# Patient Record
Sex: MALE | Race: WHITE | NOT HISPANIC OR LATINO | Employment: FULL TIME | URBAN - NONMETROPOLITAN AREA
[De-identification: names, ages, dates, MRNs, and addresses within clinical notes are randomized per-mention and may not be internally consistent; named-entity substitution may affect disease eponyms.]

---

## 2019-12-03 ENCOUNTER — OFFICE VISIT CONVERTED (OUTPATIENT)
Dept: FAMILY MEDICINE CLINIC | Age: 39
End: 2019-12-03
Attending: FAMILY MEDICINE

## 2019-12-03 ENCOUNTER — HOSPITAL ENCOUNTER (OUTPATIENT)
Dept: OTHER | Facility: HOSPITAL | Age: 39
Discharge: HOME | End: 2019-12-03
Attending: FAMILY MEDICINE

## 2019-12-03 LAB
ALBUMIN SERPL-MCNC: 4.5 G/DL (ref 3.5–5)
ALBUMIN/GLOB SERPL: 1.5 {RATIO} (ref 1.4–2.6)
ALP SERPL-CCNC: 61 U/L (ref 53–128)
ALT SERPL-CCNC: 46 U/L (ref 10–40)
ANION GAP SERPL CALC-SCNC: 20 MMOL/L (ref 8–19)
AST SERPL-CCNC: 20 U/L (ref 15–50)
BASOPHILS # BLD AUTO: 0.05 10*3/UL (ref 0–0.2)
BASOPHILS NFR BLD AUTO: 0.3 % (ref 0–3)
BILIRUB SERPL-MCNC: 0.73 MG/DL (ref 0.2–1.3)
BUN SERPL-MCNC: 12 MG/DL (ref 5–25)
BUN/CREAT SERPL: 10 {RATIO} (ref 6–20)
CALCIUM SERPL-MCNC: 9.5 MG/DL (ref 8.7–10.4)
CHLORIDE SERPL-SCNC: 101 MMOL/L (ref 99–111)
CONV ABS IMM GRAN: 0.07 10*3/UL (ref 0–0.2)
CONV CO2: 23 MMOL/L (ref 22–32)
CONV IMMATURE GRAN: 0.5 % (ref 0–1.8)
CONV TOTAL PROTEIN: 7.5 G/DL (ref 6.3–8.2)
CREAT UR-MCNC: 1.21 MG/DL (ref 0.7–1.2)
DEPRECATED RDW RBC AUTO: 38.5 FL (ref 35.1–43.9)
EOSINOPHIL # BLD AUTO: 0.02 10*3/UL (ref 0–0.7)
EOSINOPHIL # BLD AUTO: 0.1 % (ref 0–7)
ERYTHROCYTE [DISTWIDTH] IN BLOOD BY AUTOMATED COUNT: 11.9 % (ref 11.6–14.4)
GFR SERPLBLD BASED ON 1.73 SQ M-ARVRAT: >60 ML/MIN/{1.73_M2}
GLOBULIN UR ELPH-MCNC: 3 G/DL (ref 2–3.5)
GLUCOSE SERPL-MCNC: 103 MG/DL (ref 70–99)
HCT VFR BLD AUTO: 46.2 % (ref 42–52)
HGB BLD-MCNC: 15.9 G/DL (ref 14–18)
LYMPHOCYTES # BLD AUTO: 1.67 10*3/UL (ref 1–5)
LYMPHOCYTES NFR BLD AUTO: 11.3 % (ref 20–45)
MCH RBC QN AUTO: 30.6 PG (ref 27–31)
MCHC RBC AUTO-ENTMCNC: 34.4 G/DL (ref 33–37)
MCV RBC AUTO: 89 FL (ref 80–96)
MONOCYTES # BLD AUTO: 1.19 10*3/UL (ref 0.2–1.2)
MONOCYTES NFR BLD AUTO: 8.1 % (ref 3–10)
NEUTROPHILS # BLD AUTO: 11.75 10*3/UL (ref 2–8)
NEUTROPHILS NFR BLD AUTO: 79.7 % (ref 30–85)
NRBC CBCN: 0 % (ref 0–0.7)
OSMOLALITY SERPL CALC.SUM OF ELEC: 290 MOSM/KG (ref 273–304)
PLATELET # BLD AUTO: 185 10*3/UL (ref 130–400)
PMV BLD AUTO: 10.6 FL (ref 9.4–12.4)
POTASSIUM SERPL-SCNC: 3.6 MMOL/L (ref 3.5–5.3)
RBC # BLD AUTO: 5.19 10*6/UL (ref 4.7–6.1)
SODIUM SERPL-SCNC: 140 MMOL/L (ref 135–147)
TSH SERPL-ACNC: 2.37 M[IU]/L (ref 0.27–4.2)
WBC # BLD AUTO: 14.75 10*3/UL (ref 4.8–10.8)

## 2019-12-05 ENCOUNTER — OFFICE VISIT CONVERTED (OUTPATIENT)
Dept: FAMILY MEDICINE CLINIC | Age: 39
End: 2019-12-05
Attending: FAMILY MEDICINE

## 2019-12-05 LAB — BACTERIA SPEC AEROBE CULT: NORMAL

## 2020-01-07 ENCOUNTER — OFFICE VISIT CONVERTED (OUTPATIENT)
Dept: FAMILY MEDICINE CLINIC | Age: 40
End: 2020-01-07
Attending: FAMILY MEDICINE

## 2020-01-07 ENCOUNTER — HOSPITAL ENCOUNTER (OUTPATIENT)
Dept: OTHER | Facility: HOSPITAL | Age: 40
Discharge: HOME | End: 2020-01-07
Attending: FAMILY MEDICINE

## 2020-01-07 LAB
ALBUMIN SERPL-MCNC: 4.4 G/DL (ref 3.5–5)
ALBUMIN/GLOB SERPL: 1.5 {RATIO} (ref 1.4–2.6)
ALP SERPL-CCNC: 61 U/L (ref 53–128)
ALT SERPL-CCNC: 42 U/L (ref 10–40)
ANION GAP SERPL CALC-SCNC: 16 MMOL/L (ref 8–19)
AST SERPL-CCNC: 22 U/L (ref 15–50)
BASOPHILS # BLD MANUAL: 0.03 10*3/UL (ref 0–0.2)
BASOPHILS NFR BLD MANUAL: 0.4 % (ref 0–3)
BILIRUB SERPL-MCNC: 0.62 MG/DL (ref 0.2–1.3)
BUN SERPL-MCNC: 14 MG/DL (ref 5–25)
BUN/CREAT SERPL: 12 {RATIO} (ref 6–20)
CALCIUM SERPL-MCNC: 9 MG/DL (ref 8.7–10.4)
CHLORIDE SERPL-SCNC: 104 MMOL/L (ref 99–111)
CONV CO2: 25 MMOL/L (ref 22–32)
CONV TOTAL PROTEIN: 7.4 G/DL (ref 6.3–8.2)
CREAT UR-MCNC: 1.19 MG/DL (ref 0.7–1.2)
DEPRECATED RDW RBC AUTO: 40.4 FL
EOSINOPHIL # BLD MANUAL: 0.08 10*3/UL (ref 0–0.7)
EOSINOPHIL NFR BLD MANUAL: 1 % (ref 0–7)
ERYTHROCYTE [DISTWIDTH] IN BLOOD BY AUTOMATED COUNT: 12.3 % (ref 11.5–14.5)
GFR SERPLBLD BASED ON 1.73 SQ M-ARVRAT: >60 ML/MIN/{1.73_M2}
GLOBULIN UR ELPH-MCNC: 3 G/DL (ref 2–3.5)
GLUCOSE SERPL-MCNC: 129 MG/DL (ref 70–99)
GRANS (ABSOLUTE): 5.31 10*3/UL (ref 2–8)
GRANS: 68.4 % (ref 30–85)
HBA1C MFR BLD: 16.1 G/DL (ref 14–18)
HCT VFR BLD AUTO: 47.4 % (ref 42–52)
IMM GRANULOCYTES # BLD: 0.04 10*3/UL (ref 0–0.54)
IMM GRANULOCYTES NFR BLD: 0.5 % (ref 0–0.43)
LYMPHOCYTES # BLD MANUAL: 1.71 10*3/UL (ref 1–5)
LYMPHOCYTES NFR BLD MANUAL: 7.7 % (ref 3–10)
MCH RBC QN AUTO: 30.3 PG (ref 27–31)
MCHC RBC AUTO-ENTMCNC: 34 G/DL (ref 33–37)
MCV RBC AUTO: 89.3 FL (ref 80–96)
MONOCYTES # BLD AUTO: 0.6 10*3/UL (ref 0.2–1.2)
OSMOLALITY SERPL CALC.SUM OF ELEC: 294 MOSM/KG (ref 273–304)
PLATELET # BLD AUTO: 201 10*3/UL (ref 130–400)
PMV BLD AUTO: 10.1 FL (ref 7.4–10.4)
POTASSIUM SERPL-SCNC: 3.7 MMOL/L (ref 3.5–5.3)
RBC # BLD AUTO: 5.31 10*6/UL (ref 4.7–6.1)
SODIUM SERPL-SCNC: 141 MMOL/L (ref 135–147)
TSH SERPL-ACNC: 1.83 M[IU]/L (ref 0.27–4.2)
VARIANT LYMPHS NFR BLD MANUAL: 22 % (ref 20–45)
WBC # BLD AUTO: 7.77 10*3/UL (ref 4.8–10.8)

## 2020-01-10 LAB
EST. AVERAGE GLUCOSE BLD GHB EST-MCNC: 108 MG/DL
HBA1C MFR BLD: 5.4 % (ref 3.5–5.7)

## 2020-01-17 LAB
ALDOST SERPL-MCNC: 7.8 NG/DL (ref 0–30)
ALDOST/RENIN PLAS-RTO: 5 {RATIO} (ref 0–30)
RENIN PLAS-CCNC: 1.55 NG/ML/HR (ref 0.17–5.38)

## 2020-02-25 ENCOUNTER — OFFICE VISIT CONVERTED (OUTPATIENT)
Dept: FAMILY MEDICINE CLINIC | Age: 40
End: 2020-02-25
Attending: NURSE PRACTITIONER

## 2021-05-18 NOTE — PROGRESS NOTES
Robin Patel  1980     Office/Outpatient Visit    Visit Date: Tue, Dec 3, 2019 03:17 pm    Provider: Robin Guerrero MD (Assistant: Elvi Pruitt, )    Location: [facility]        Electronically signed by Robin Guerrero MD on  12/03/2019 07:41:32 PM                             Subjective:        CC: Mr. Patel is a 39 year old White male.  This is his first visit to the clinic.  presents today due to sore throat, congestion         HPI:       Sore throat since yesterday along with feeling ill. This morning it felt and he coughed up some brown mucous. No fever but feels sweaty, warm, no chills.       BP today is 143/97 with a HR of 118 and recheck is 144/96 with a HR of 119. BP has been elevated here previously as well. He denies feeling tachycardia or palpitations. No headache or blurry vision.    ROS:     CONSTITUTIONAL:  Positive for fatigue.   Negative for fever.      EYES:  Negative for blurred vision.      E/N/T:  Positive for nasal congestion, sinus pressure and sore throat.   Negative for diminished hearing.      CARDIOVASCULAR:  Negative for chest pain and palpitations.      RESPIRATORY:  Positive for recent cough ( with scant amounts of purulent sputum ).   Negative for dyspnea.      GASTROINTESTINAL:  Negative for abdominal pain, constipation, diarrhea, nausea and vomiting.      MUSCULOSKELETAL:  Negative for arthralgias and myalgias.      NEUROLOGICAL:  Negative for weakness.      PSYCHIATRIC:  Negative for anxiety, depression and sleep disturbance.          Past Medical History / Family History / Social History:         Last Reviewed on 12/03/2019 03:45 PM by Robin Guerrero    Past Medical History:             PAST MEDICAL HISTORY     UNREMARKABLE         Surgical History:         wisdom teeth removal;         Family History:     Father: Healthy     Mother: chronic bronchitis     Brother(s): Healthy; 1 brother(s) total     Sister(s): 1 sister(s) total;  Breast Cancer      Paternal Grandfather: Medical history unknown;      Paternal Grandmother: Medical history unknown;      Maternal Grandfather: Lung Cancer     Maternal Grandmother: ;  uterine CA         Social History:     Occupation: a equipment rental.   [employer]     Marital Status: Single     Children: None         Tobacco/Alcohol/Supplements:     Last Reviewed on 2019 03:45 PM by Robin Guerrero    Tobacco: He has a past history of cigarette smoking; quit date:  .  Non-drinker     Caffeine:  He admits to consuming caffeine via soda ( -Mt Dew ).          Substance Abuse History:     Last Reviewed on 2019 03:45 PM by Robin Guerrero    None         Mental Health History:     Last Reviewed on 2019 03:45 PM by Robin Guerrero    NEGATIVE         Communicable Diseases (eg STDs):     Last Reviewed on 2019 03:45 PM by Robin Guerrero        Current Problems:     Last Reviewed on 2019 03:45 PM by Robin Guerrero    Acute pharyngitis, unspecified        Immunizations:     Influenza, seasonal, injectable, preservative free 2019        Allergies:     Last Reviewed on 2019 03:45 PM by Robin Guerrero    No Known Allergies.        Current Medications:     Last Reviewed on 2019 03:45 PM by Robin Guerrero    OTC cold and sinus PRN     Xyzal 5 mg oral tablet [daily]        Objective:        Vitals:         Current: 12/3/2019 3:29:48 PM    Ht:  6 ft, 0.25 in;  Wt: 299 lbs;  BMI: 40.3T: 98.9 F (oral);  BP: 143/97 mm Hg (left arm, sitting);  P: 118 bpm (left arm (BP Cuff), sitting)        Repeat:     3:31:52 PM  BP:   144/96mm Hg (right arm, sitting, HR: 119)     Exams:     PHYSICAL EXAM:     GENERAL: Vitals recorded well developed, well nourished;  well groomed;  no apparent distress, tired-appearing;     EYES: extraocular movements intact; conjunctiva and cornea are normal; PERRLA;     E/N/T: OROPHARYNX: posterior pharynx shows erythema;     NECK: range of motion is  normal; thyroid exam reveals not enlarged;     RESPIRATORY: normal respiratory rate and pattern with no distress; normal breath sounds with no rales, rhonchi, wheezes or rubs;     CARDIOVASCULAR: normal rate; rhythm is regular;  no systolic murmur; no edema;     GASTROINTESTINAL: nontender; normal bowel sounds;     LYMPHATIC: no enlargement of cervical or facial nodes;     MUSCULOSKELETAL: normal gait; normal overall tone     NEUROLOGIC: mental status: alert and oriented x 3;     PSYCHIATRIC:  appropriate affect and demeanor; normal speech pattern; grossly normal memory;         Lab/Test Results:         Rapid Strep Screen: Negative (12/03/2019),     Performed by:: tls (12/03/2019),             Assessment:         J02.9   Acute pharyngitis, unspecified       I10   Essential (primary) hypertension           ORDERS:         Lab Orders:       [id number]  BDC [facility] CBC with 3 part diff  (Send-Out)            66020  COMP - [facility] Comp. Metabolic Panel  (Send-Out)            64684  TSH - [facility] TSH  (Send-Out)            APPTO  Appointment need  (In-House)            47266  Group A Streptococcus detection by immunoassay with direct optical observation  (In-House)            31555  [facility] Throat culture, strep  (Send-Out)              Other Orders:         Depression screen negative  (In-House)                      Plan:         Acute pharyngitis, unspecifiedRapid strep is negative, pt advised this is likely viral pharyngitis and will resolve with time and rest and supportive care.           Orders:       [id number]  Group A Streptococcus detection by immunoassay with direct optical observation  (In-House)            07862  [facility] Throat culture, strep  (Send-Out)              Essential (primary) hypertensionBP slightly elevated today and at previous visits. HR is more so elevated and basic labs are ordered to assess for anemia and hyperthyroidism. Pt will RTC in 2 weeks for BP and HR recheck. EKG not ordered today given  his acute illness and he has no palpitations, dizziness, or lightheadedness.    LABORATORY:  Labs ordered to be performed today include CBC, Comprehensive metabolic panel, and TSH.  [facility] PHQ-9 Depression Screening: Completed form scanned and in chart; Total Score 0; Negative Depression Screen     FOLLOW-UP: Schedule a follow-up visit in 1 month.:.            Orders:       [id number]  BDCBC - [facility] CBC with 3 part diff  (Send-Out)            46179  COMP - [facility] Comp. Metabolic Panel  (Send-Out)            61862  TSH - [facility] TSH  (Send-Out)            APPTO  Appointment need  (In-House)              Depression screen negative  (In-House)                  Patient Recommendations:        For  Essential (primary) hypertension:    Schedule a follow-up visit in 1 month.                APPOINTMENT INFORMATION:        Monday Tuesday Wednesday Thursday Friday Saturday Sunday            Time:___________________AM  PM   Date:_____________________             Charge Capture:         Primary Diagnosis:     J02.9  Acute pharyngitis, unspecified           Orders:      [id number]  Office visit - new pt, level 2  (In-House)            25944  Group A Streptococcus detection by immunoassay with direct optical observation  (In-House)              I10  Essential (primary) hypertension           Orders:      APPTO  Appointment need  (In-House)              Depression screen negative  (In-House)                  ADDENDUMS:      ____________________________________    Addendum: 12/31/2019 03:44 PM - Robin Guerrero        ADDENDUM: Add [id number]; Remove [id number]

## 2021-05-18 NOTE — PROGRESS NOTES
VintonRobin  1980     Office/Outpatient Visit    Visit Date: Tue, Jan 7, 2020 10:05 am    Provider: Robin Guerrero MD (Assistant: Darlene Cordon MA)    Location: [facility]        Electronically signed by Robin Guerrero MD on  01/07/2020 06:44:23 PM                             Subjective:        CC: Mr. Patel is a 39 year old White male.  This is a follow-up visit.  blood pressure         HPI:       BP today is 145/95 with a HR of 99. BP and HR are typically elevated on all visits here. I saw pt 1 month ago for sore throat and this resolved after he was given augmentin by Dr. Orourke when Sx worsen. He is not currently on any BP medications. No headache or blurry vision. He still has more nasal and sinus congestion than usual. He was previously on xyzal.    ROS:     CONSTITUTIONAL:  Negative for fatigue and fever.      EYES:  Negative for blurred vision.      E/N/T:  Positive for nasal congestion.   Negative for diminished hearing.      CARDIOVASCULAR:  Negative for chest pain and palpitations.      RESPIRATORY:  Negative for recent cough and dyspnea.      GASTROINTESTINAL:  Negative for abdominal pain, constipation, diarrhea, nausea and vomiting.      MUSCULOSKELETAL:  Negative for arthralgias and myalgias.      INTEGUMENTARY:  Negative for atypical mole(s) and rash.      NEUROLOGICAL:  Negative for weakness.      ALLERGIC/IMMUNOLOGIC:  Positive for seasonal allergies.      PSYCHIATRIC:  Negative for anxiety, depression and sleep disturbance.          Past Medical History / Family History / Social History:         Last Reviewed on 1/07/2020 06:43 PM by Robin Guerrero    Past Medical History:             PAST MEDICAL HISTORY     UNREMARKABLE         Surgical History:         wisdom teeth removal;         Family History:     Father: Healthy     Mother: chronic bronchitis     Brother(s): Healthy; 1 brother(s) total     Sister(s): 1 sister(s) total;  Breast Cancer     Paternal  Grandfather: Medical history unknown;      Paternal Grandmother: Medical history unknown;      Maternal Grandfather: Lung Cancer     Maternal Grandmother: ;  uterine CA         Social History:     Occupation: a equipment rental.   [employer]     Marital Status: Single     Children: None         Tobacco/Alcohol/Supplements:     Last Reviewed on 2020 06:43 PM by Robin Guerrero    Tobacco: He has a past history of cigarette smoking; quit date:  .  Non-drinker     Caffeine:  He admits to consuming caffeine via soda ( -Mt Dew ).          Substance Abuse History:     Last Reviewed on 2020 06:43 PM by Robin Guerrero    None         Mental Health History:     Last Reviewed on 2020 06:43 PM by Robin Guerrero    NEGATIVE         Communicable Diseases (eg STDs):     Last Reviewed on 2020 06:43 PM by Robin Guerrero        Current Problems:     Last Reviewed on 2020 06:43 PM by Robin Guerrero    Essential (primary) hypertension    Acute pharyngitis, unspecified        Immunizations:     Influenza, seasonal, injectable, preservative free 2019        Allergies:     Last Reviewed on 2020 06:43 PM by Robin Guerrero    No Known Allergies.        Current Medications:     Last Reviewed on 2020 06:43 PM by Robin Guerrero    Xyzal 5 mg oral tablet [daily]    OTC cold and sinus PRN         Objective:        Vitals:         Current: 2020 10:07:32 AM    Ht:  6 ft, 0.25 in;  Wt: 294.2 lbs;  BMI: 39.6T: 98.4 F (oral);  BP: 145/95 mm Hg (left arm, sitting);  P: 99 bpm (left arm (BP Cuff), sitting);  sCr: 1.21 mg/dL;  GFR: 100.50        Exams:     PHYSICAL EXAM:     GENERAL: Vitals recorded well developed, well nourished;  well groomed;  no apparent distress;     EYES: extraocular movements intact; conjunctiva and cornea are normal; PERRLA;     E/N/T: OROPHARYNX:  normal mucosa, dentition, gingiva, and posterior pharynx;     RESPIRATORY: normal respiratory rate  and pattern with no distress; normal breath sounds with no rales, rhonchi, wheezes or rubs;     CARDIOVASCULAR: normal rate; rhythm is regular;  no systolic murmur; no edema;     GASTROINTESTINAL: nontender; normal bowel sounds;     MUSCULOSKELETAL: normal gait; normal overall tone     NEUROLOGIC: mental status: alert and oriented x 3;     PSYCHIATRIC:  appropriate affect and demeanor; normal speech pattern; grossly normal memory;         Assessment:         I10   Essential (primary) hypertension           ORDERS:         Meds Prescribed:       [New Rx] metoprolol succinate 25 mg oral Tablet, Extended Release 24 hr [take 1 tablet (25 mg) by oral route once daily], #90 (ninety) tablets, Refills: 1 (one)       [New Rx] lisinopriL 10 mg oral tablet [take 1 tablet (10 mg) by oral route once daily], #30 (thirty) tablets, Refills: 2 (two)         Lab Orders:       [id number]  ALDR - [facility] Aldosterone/renin ratio  (Send-Out)            [id number]  BDCBC [facility] CBC with 3 part diff  (Send-Out)            42556  COMP - [facility] Comp. Metabolic Panel  (Send-Out)            00024  TSH - [facility] TSH  (Send-Out)            APPTO  Appointment need  (In-House)                      Plan:         Essential (primary) hypertensionBP and HR are elevate today and at all previous visits. He will start metoprolol 25 mg qd today (or tomorrow) and then lisinopril 10 mg qd in 3-4 days. Pt is advised to check BP at least 3 or 4x/week and to make a log. Labs are rechecked to ensure WBC and Cr have normalized.     LABORATORY:  Labs ordered to be performed today include Aldosterone/renin ratio, CBC, Comprehensive metabolic panel, and TSH.      FOLLOW-UP: Schedule a follow-up visit in 6 months.:.            Prescriptions:       [New Rx] metoprolol succinate 25 mg oral Tablet, Extended Release 24 hr [take 1 tablet (25 mg) by oral route once daily], #90 (ninety) tablets, Refills: 1 (one)       [New Rx] lisinopriL 10 mg oral tablet [take 1 tablet (10 mg) by oral route  once daily], #30 (thirty) tablets, Refills: 2 (two)           Orders:       [id number]  ALDRR - [facility] Aldosterone/renin ratio  (Send-Out)            47166  BDCBC - [facility] CBC with 3 part diff  (Send-Out)            [id number]  COMP - [facility] Comp. Metabolic Panel  (Send-Out)            37409  TSH - [facility] TSH  (Send-Out)            APPTO  Appointment need  (In-House)                Patient Education Handouts:       DASH Diet              Patient Recommendations:        For  Essential (primary) hypertension:    Schedule a follow-up visit in 6 months.                APPOINTMENT INFORMATION:        Monday Tuesday Wednesday Thursday Friday Saturday Sunday            Time:___________________AM  PM   Date:_____________________             Charge Capture:         Primary Diagnosis:     I10  Essential (primary) hypertension           Orders:      [id number]  Office/outpatient visit; established patient, level 3  (In-House)            APPTO  Appointment need  (In-House)

## 2021-05-18 NOTE — PROGRESS NOTES
Robin Patel  1980     Office/Outpatient Visit    Visit Date: Thu, Dec 5, 2019 10:49 am    Provider: Ortiz Orourke MD (Assistant: Stephanie Geronimo RN)    Location: [facility]        Electronically signed by Ortiz Orourke MD on  2019 11:49:45 AM                             Subjective:        CC: sore throat    HPI:           Patient complains of acute pharyngitis, unspecified.  This has been noted for the past several days.  The discomfort occurs constantly.  Associated symptoms include productive cough and nasal congestion.  He reports recent exposure to illness from co-workers.  Dickson was seen here a couple of days ago and says that he feels worse compared to then.  His white blood count was elevated then.    ROS:     CONSTITUTIONAL:  Positive for fever ( low grade ).   Negative for chills.      CARDIOVASCULAR:  Negative for chest pain and palpitations.      GASTROINTESTINAL:  Negative for abdominal pain, nausea and vomiting.      INTEGUMENTARY:  Negative for atypical mole(s) and rash.          Past Medical History / Family History / Social History:         Last Reviewed on 2019 03:45 PM by Robin Guerrero    Past Medical History:             PAST MEDICAL HISTORY     UNREMARKABLE         Surgical History:         wisdom teeth removal;         Family History:     Father: Healthy     Mother: chronic bronchitis     Brother(s): Healthy; 1 brother(s) total     Sister(s): 1 sister(s) total;  Breast Cancer     Paternal Grandfather: Medical history unknown;      Paternal Grandmother: Medical history unknown;      Maternal Grandfather: Lung Cancer     Maternal Grandmother: ;  uterine CA         Social History:     Occupation: a equipment rental.   [employer]     Marital Status: Single     Children: None         Tobacco/Alcohol/Supplements:     Last Reviewed on 2019 03:45 PM by Robin Guerrero    Tobacco: He has a past history of cigarette smoking;  quit date:  2014.  Non-drinker     Caffeine:  He admits to consuming caffeine via soda ( -Mt Dew ).          Substance Abuse History:     Last Reviewed on 12/03/2019 03:45 PM by Robin Guerrero    None         Mental Health History:     Last Reviewed on 12/03/2019 03:45 PM by Robin Guerrero    NEGATIVE         Communicable Diseases (eg STDs):     Last Reviewed on 12/03/2019 03:45 PM by Robin Guerrero        Current Problems:     Last Reviewed on 12/03/2019 03:45 PM by Robin Guerrero    Essential (primary) hypertension    Acute pharyngitis, unspecified        Immunizations:     Influenza, seasonal, injectable, preservative free 9/30/2019        Allergies:     Last Reviewed on 12/03/2019 03:45 PM by Robin Guerrero    No Known Allergies.        Current Medications:     Last Reviewed on 12/03/2019 03:45 PM by Robin Guerrero    Xyzal 5 mg oral tablet [daily]    OTC cold and sinus PRN         Objective:        Vitals:         Current: 12/5/2019 10:57:33 AM    Ht:  6 ft, 0.25 in;  Wt: 292.8 lbs;  BMI: 39.4T: 98.4 F (oral);  BP: 143/80 mm Hg (right arm, sitting);  P: 115 bpm (right arm (BP Cuff), sitting);  sCr: 1.21 mg/dL;  GFR: 100.29        Exams:     PHYSICAL EXAM:     GENERAL: Vitals recorded well developed, well nourished;     E/N/T: EARS:  normal external auditory canals and tympanic membranes;  grossly normal hearing; OROPHARYNX: posterior pharynx shows 3+ tonsils, a posterior pharyngeal exudate, and erythematous tonsils;     NECK: range of motion is normal; thyroid is non-palpable;     RESPIRATORY: normal respiratory rate and pattern with no distress; normal breath sounds with no rales, rhonchi, wheezes or rubs;     CARDIOVASCULAR: normal rate; rhythm is regular;  no systolic murmur;     GASTROINTESTINAL: nontender; normal bowel sounds; no masses;     LYMPHATIC: no enlargement of cervical or facial nodes; no supraclavicular nodes;     SKIN:  no significant rashes or lesions; no suspicious moles;          Assessment:         J02.9   Acute pharyngitis, unspecified           ORDERS:         Meds Prescribed:       [New Rx] Augmentin 875-125 mg oral tablet [take 1 tablet by oral route every 12 hours], #20 (twenty) tablets, Refills: 0 (zero)       [New Rx] predniSONE 20 mg oral tablet [take 2 tablets (40 mg) by oral route once daily], #6 (six) tablets, Refills: 0 (zero)                 Plan:         Acute pharyngitis, unspecified        RECOMMENDATIONS given include: Robin is still pretty sick with the throat.  I've reviewed his recent care including labs.  We will cover him for the throat and associated inflammation as noted below.  He did not work today or yesterday and will not work tomorrow.  If he does not improve as expected, he will let us know.  With regard to his blood pressure, I have not recommended any particular change until he feels better..  MIPS Vaccines Flu and Pneumonia updated in Shot record           Prescriptions:       [New Rx] Augmentin 875-125 mg oral tablet [take 1 tablet by oral route every 12 hours], #20 (twenty) tablets, Refills: 0 (zero)       [New Rx] predniSONE 20 mg oral tablet [take 2 tablets (40 mg) by oral route once daily], #6 (six) tablets, Refills: 0 (zero)           Patient Education Handouts:       Sore Throat (Pharyngitis)              Charge Capture:         Primary Diagnosis:     J02.9  Acute pharyngitis, unspecified           Orders:      [id number]  Office/outpatient visit; established patient, level 3  (In-House)

## 2021-05-18 NOTE — PROGRESS NOTES
Robin Patel  1980     Office/Outpatient Visit    Visit Date: Tue, Feb 25, 2020 10:13 am    Provider: Tonia Garay N.P. (Assistant: Elvi Pruitt, )    Location: [facility]        Electronically signed by Tonia Garay N.P. on  02/25/2020 12:37:53 PM                             Subjective:        CC: Mr. Patel is a 39 year old White male.  presents today due to cough, chills         HPI: 39-year-old male presenting to clinic complaining of chills, cough, headache, body aches and fatigue since Sunday (2 days ago). Patient's girlfriend was diagnosed with flu last week.  Patient has been using ibuprofen at home to help alleviate symptoms. History of hypertension.  No drug allergies.  Patient has been taking over-the-counter cold and sinus medication as well.    ROS:     CONSTITUTIONAL:  Negative for fever.      EYES:  Negative for eye drainage.      E/N/T:  Positive for nasal congestion, frequent rhinorrhea, sinus pressure and sore throat.   Negative for ear pain or diminished hearing.      CARDIOVASCULAR:  Negative for chest pain, dizziness and palpitations.      RESPIRATORY:  Positive for recent cough ( typically dry ).   Negative for dyspnea.      GASTROINTESTINAL:  Positive for nausea.   Negative for abdominal pain, constipation, diarrhea or vomiting.      GENITOURINARY:  Negative for dysuria.      MUSCULOSKELETAL:  Positive for myalgias.      INTEGUMENTARY:  Negative for rash.      NEUROLOGICAL:  Positive for headaches.   Negative for weakness.          Past Medical History / Family History / Social History:         Last Reviewed on 2/25/2020 12:34 PM by Tonia Garay    Past Medical History:             PAST MEDICAL HISTORY     UNREMARKABLE         Surgical History:         wisdom teeth removal;         Family History:     Father: Healthy     Mother: chronic bronchitis     Brother(s): Healthy; 1 brother(s) total     Sister(s): 1 sister(s) total;  Breast Cancer      Paternal Grandfather: Medical history unknown;      Paternal Grandmother: Medical history unknown;      Maternal Grandfather: Lung Cancer     Maternal Grandmother: ;  uterine CA         Social History:     Occupation: a equipment rental.   [employer]     Marital Status: Single     Children: None         Tobacco/Alcohol/Supplements:     Last Reviewed on 2020 12:34 PM by Tonia Garay    Tobacco: He has a past history of cigarette smoking; quit date:  .  Non-drinker     Caffeine:  He admits to consuming caffeine via soda ( -Mt Dew ).          Substance Abuse History:     Last Reviewed on 2020 12:34 PM by Tonia Garay    None         Mental Health History:     Last Reviewed on 2020 06:43 PM by Robin Guerrero    NEGATIVE         Communicable Diseases (eg STDs):     Last Reviewed on 2020 06:43 PM by Robin Guerrero        Immunizations:     Influenza, seasonal, injectable, preservative free 2019        Allergies:     Last Reviewed on 2020 12:34 PM by Tonia Garay    No Known Allergies.        Current Medications:     Last Reviewed on 2020 12:34 PM by Tonia Garay    Xyzal 5 mg oral tablet [daily]    OTC cold and sinus PRN     metoprolol succinate 25 mg oral Tablet, Extended Release 24 hr [take 1 tablet (25 mg) by oral route once daily]    lisinopriL 10 mg oral tablet [take 1 tablet (10 mg) by oral route once daily]        Objective:        Vitals:         Current: 2020 10:19:37 AM    Ht:  6 ft, 0.25 in;  Wt: 290.2 lbs;  BMI: 39.1T: 99.7 F (oral);  BP: 134/85 mm Hg (left arm, sitting);  P: 108 bpm (left arm (BP Cuff), sitting);  sCr: 1.19 mg/dL;  GFR: 101.59        Exams:     PHYSICAL EXAM:     GENERAL: Vitals recorded no apparent distress, appears minimally ill;     EYES: conjunctiva and cornea are normal; PERRLA;     E/N/T: OROPHARYNX:  normal mucosa, dentition, gingiva, and posterior pharynx;     RESPIRATORY: normal respiratory rate  and pattern with no distress; normal breath sounds with no rales, rhonchi, wheezes or rubs;     CARDIOVASCULAR: normal rate; rhythm is regular;  no systolic murmur; no edema;     LYMPHATIC: no enlargement of cervical or facial nodes;     SKIN:  no rash; no jaundice, good turgor;     MUSCULOSKELETAL: normal gait; normal overall tone     NEUROLOGIC: mental status: alert and oriented x 3;         Lab/Test Results:         Influenza A: Negative (02/25/2020),     Influenza B: Positive (02/25/2020),     Performed by:: pr (02/25/2020),             Assessment:         J09   Influenza due to certain identified influenza viruses           ORDERS:         Meds Prescribed:       [New Rx] oseltamivir 75 mg oral capsule [take 1 capsule (75 mg) by oral route 2 times per day], #10 (ten) capsules, Refills: 0 (zero)         Lab Orders:       27901  Infectious agent antigen detection by immunoassay; Influenza  (In-House)            54936-26  Infectious agent antigen detection by immunoassay; Influenza  (In-House)                      Plan:         Influenza due to certain identified influenza virusesIncrease fluid intake and rest.  Tylenol/ibuprofen for discomfort/fever.  Potential side effects of antiviral medication discussed.  Patient wishes to try and it was sent to pharmacy.  Patient can continue taking cold and sinus medication at home if it is helping with symptoms.  Patient to return to clinic as needed.  Patient off work until Friday.  Patient verbalizes understanding and has no further questions upon discharge.          Prescriptions:       [New Rx] oseltamivir 75 mg oral capsule [take 1 capsule (75 mg) by oral route 2 times per day], #10 (ten) capsules, Refills: 0 (zero)           Orders:       [id number]  Infectious agent antigen detection by immunoassay; Influenza  (In-House)            57272-72  Infectious agent antigen detection by immunoassay; Influenza  (In-House)                  Charge Capture:         Primary Diagnosis:      J09  Influenza due to certain identified influenza viruses           Orders:      [id number]  Office/outpatient visit; established patient, level 3  (In-House)            81428  Infectious agent antigen detection by immunoassay; Influenza  (In-House)            09132-12  Infectious agent antigen detection by immunoassay; Influenza  (In-House)

## 2021-07-01 VITALS
TEMPERATURE: 98.9 F | BODY MASS INDEX: 40.5 KG/M2 | HEART RATE: 118 BPM | SYSTOLIC BLOOD PRESSURE: 144 MMHG | HEIGHT: 72 IN | WEIGHT: 299 LBS | DIASTOLIC BLOOD PRESSURE: 96 MMHG

## 2021-07-01 VITALS
TEMPERATURE: 98.4 F | WEIGHT: 292.8 LBS | HEIGHT: 72 IN | HEART RATE: 115 BPM | DIASTOLIC BLOOD PRESSURE: 80 MMHG | BODY MASS INDEX: 39.66 KG/M2 | SYSTOLIC BLOOD PRESSURE: 143 MMHG

## 2021-07-02 VITALS
DIASTOLIC BLOOD PRESSURE: 85 MMHG | SYSTOLIC BLOOD PRESSURE: 134 MMHG | BODY MASS INDEX: 39.31 KG/M2 | HEIGHT: 72 IN | HEART RATE: 108 BPM | TEMPERATURE: 99.7 F | WEIGHT: 290.2 LBS

## 2021-07-02 VITALS
HEART RATE: 99 BPM | HEIGHT: 72 IN | DIASTOLIC BLOOD PRESSURE: 95 MMHG | WEIGHT: 294.2 LBS | BODY MASS INDEX: 39.85 KG/M2 | TEMPERATURE: 98.4 F | SYSTOLIC BLOOD PRESSURE: 145 MMHG

## 2021-08-09 NOTE — PROGRESS NOTES
"Chief Complaint  Hypertension (med refill)    Subjective          Robin Patel presents to [facility]  He is here to establish care    HTN- has been on BP medication since he quit smoking.  He started gaining weight after he quit smoking, and that's when his BP increased. He is not taking any blood pressure medication, as he has been out of his medication for 5-6 months.   He was taking lisinopril 10 mg daily and metoprolol 25 mg daily.  He doesn't follow a low-Na+ diet.  He will drink a Mt. Dew in the morning, maybe a Pepsi later in the afternoon.  He does not check his BP at home.  He doesn't exercise regularly.  He denies blurred vision, H/A, chest pain, hematuria, and pedal edema.  He believes that his paternal GM may have  from a MI.      Objective   Vital Signs:   /94 (BP Location: Left arm, Patient Position: Sitting)   Pulse 96   Ht 188 cm (74\")   Wt (!) 141 kg (310 lb 6.4 oz)   BMI 39.85 kg/m²     Physical Exam  Constitutional:       General: He is not in acute distress.     Appearance: Normal appearance. He is obese.   HENT:      Head: Normocephalic.   Eyes:      Pupils: Pupils are equal, round, and reactive to light.      Visual Fields: Right eye visual fields normal and left eye visual fields normal.   Neck:      Trachea: Trachea normal.   Cardiovascular:      Rate and Rhythm: Normal rate and regular rhythm.      Heart sounds: Normal heart sounds.   Pulmonary:      Effort: Pulmonary effort is normal.      Breath sounds: Normal breath sounds and air entry.   Musculoskeletal:      Right lower leg: No edema.      Left lower leg: No edema.   Skin:     General: Skin is warm and dry.   Neurological:      Mental Status: He is alert and oriented to person, place, and time.   Psychiatric:         Mood and Affect: Mood and affect normal.         Behavior: Behavior normal.         Thought Content: Thought content normal.        Result Review :               "   Assessment and Plan    Diagnoses and all orders for this visit:    1. Essential hypertension (Primary)  Assessment & Plan:  He has been out of his blood pressure medication for at least 6 months.  We will refill his medication and recheck him in 1 month.    Orders:  -     metoprolol succinate XL (Toprol XL) 25 MG 24 hr tablet; Take 1 tablet by mouth Daily  Dispense: 90 tablet; Refill: 0  -     lisinopril (PRINIVIL,ZESTRIL) 10 MG tablet; Take 1 tablet by mouth Daily  Dispense: 90 tablet; Refill: 0  -     CBC (No Diff); Future  -     Comprehensive Metabolic Panel; Future  -     Hemoglobin A1c; Future  -     Lipid Panel; Future    2. Class 2 obesity due to excess calories with body mass index (BMI) of 39.0 to 39.9 in adult, unspecified whether serious comorbidity present  -     CBC (No Diff); Future  -     Comprehensive Metabolic Panel; Future  -     Hemoglobin A1c; Future  -     Lipid Panel; Future    3. Tachycardia  -     metoprolol succinate XL (Toprol XL) 25 MG 24 hr tablet; Take 1 tablet by mouth Daily  Dispense: 90 tablet; Refill: 0      Follow Up   Return in about 4 weeks (around 9/8/2021).  Patient was given instructions and counseling regarding his condition or for health maintenance advice. Please see specific information pulled into the AVS if appropriate.

## 2021-08-11 ENCOUNTER — OFFICE VISIT (OUTPATIENT)
Dept: FAMILY MEDICINE CLINIC | Age: 41
End: 2021-08-11

## 2021-08-11 VITALS
SYSTOLIC BLOOD PRESSURE: 147 MMHG | HEART RATE: 96 BPM | DIASTOLIC BLOOD PRESSURE: 94 MMHG | BODY MASS INDEX: 39.83 KG/M2 | HEIGHT: 74 IN | WEIGHT: 310.4 LBS

## 2021-08-11 DIAGNOSIS — E66.09 CLASS 2 OBESITY DUE TO EXCESS CALORIES WITH BODY MASS INDEX (BMI) OF 39.0 TO 39.9 IN ADULT, UNSPECIFIED WHETHER SERIOUS COMORBIDITY PRESENT: ICD-10-CM

## 2021-08-11 DIAGNOSIS — I10 ESSENTIAL HYPERTENSION: Primary | ICD-10-CM

## 2021-08-11 DIAGNOSIS — R00.0 TACHYCARDIA: ICD-10-CM

## 2021-08-11 PROBLEM — E66.812 CLASS 2 OBESITY DUE TO EXCESS CALORIES WITH BODY MASS INDEX (BMI) OF 39.0 TO 39.9 IN ADULT: Status: ACTIVE | Noted: 2021-08-11

## 2021-08-11 PROCEDURE — 99213 OFFICE O/P EST LOW 20 MIN: CPT | Performed by: NURSE PRACTITIONER

## 2021-08-11 RX ORDER — LISINOPRIL 10 MG/1
10 TABLET ORAL DAILY
Qty: 90 TABLET | Refills: 0 | Status: SHIPPED | OUTPATIENT
Start: 2021-08-11 | End: 2021-11-09 | Stop reason: SDUPTHER

## 2021-08-11 RX ORDER — METOPROLOL SUCCINATE 25 MG/1
25 TABLET, EXTENDED RELEASE ORAL DAILY
Qty: 90 TABLET | Refills: 0 | Status: SHIPPED | OUTPATIENT
Start: 2021-08-11 | End: 2021-11-09 | Stop reason: SDUPTHER

## 2021-08-11 RX ORDER — LISINOPRIL 10 MG/1
10 TABLET ORAL DAILY
COMMUNITY
End: 2021-08-11 | Stop reason: SDUPTHER

## 2021-08-11 NOTE — ASSESSMENT & PLAN NOTE
He has been out of his blood pressure medication for at least 6 months.  We will refill his medication and recheck him in 1 month.

## 2021-08-31 ENCOUNTER — LAB (OUTPATIENT)
Dept: LAB | Facility: HOSPITAL | Age: 41
End: 2021-08-31

## 2021-08-31 DIAGNOSIS — I10 ESSENTIAL HYPERTENSION: ICD-10-CM

## 2021-08-31 DIAGNOSIS — E66.09 CLASS 2 OBESITY DUE TO EXCESS CALORIES WITH BODY MASS INDEX (BMI) OF 39.0 TO 39.9 IN ADULT, UNSPECIFIED WHETHER SERIOUS COMORBIDITY PRESENT: ICD-10-CM

## 2021-08-31 LAB
ALBUMIN SERPL-MCNC: 4.2 G/DL (ref 3.5–5.2)
ALBUMIN/GLOB SERPL: 1.6 G/DL
ALP SERPL-CCNC: 64 U/L (ref 39–117)
ALT SERPL W P-5'-P-CCNC: 54 U/L (ref 1–41)
ANION GAP SERPL CALCULATED.3IONS-SCNC: 10 MMOL/L (ref 5–15)
AST SERPL-CCNC: 23 U/L (ref 1–40)
BILIRUB SERPL-MCNC: 0.6 MG/DL (ref 0–1.2)
BUN SERPL-MCNC: 14 MG/DL (ref 6–20)
BUN/CREAT SERPL: 10.6 (ref 7–25)
CALCIUM SPEC-SCNC: 9.3 MG/DL (ref 8.6–10.5)
CHLORIDE SERPL-SCNC: 103 MMOL/L (ref 98–107)
CHOLEST SERPL-MCNC: 131 MG/DL (ref 0–200)
CO2 SERPL-SCNC: 28 MMOL/L (ref 22–29)
CREAT SERPL-MCNC: 1.32 MG/DL (ref 0.76–1.27)
DEPRECATED RDW RBC AUTO: 40 FL (ref 37–54)
ERYTHROCYTE [DISTWIDTH] IN BLOOD BY AUTOMATED COUNT: 12.2 % (ref 12.3–15.4)
GFR SERPL CREATININE-BSD FRML MDRD: 60 ML/MIN/1.73
GLOBULIN UR ELPH-MCNC: 2.7 GM/DL
GLUCOSE SERPL-MCNC: 117 MG/DL (ref 65–99)
HBA1C MFR BLD: 5.34 % (ref 4.8–5.6)
HCT VFR BLD AUTO: 45.2 % (ref 37.5–51)
HDLC SERPL-MCNC: 33 MG/DL (ref 40–60)
HGB BLD-MCNC: 15.5 G/DL (ref 13–17.7)
LDLC SERPL CALC-MCNC: 86 MG/DL (ref 0–100)
LDLC/HDLC SERPL: 2.65 {RATIO}
MCH RBC QN AUTO: 30.9 PG (ref 26.6–33)
MCHC RBC AUTO-ENTMCNC: 34.3 G/DL (ref 31.5–35.7)
MCV RBC AUTO: 90 FL (ref 79–97)
PLATELET # BLD AUTO: 199 10*3/MM3 (ref 140–450)
PMV BLD AUTO: 10.7 FL (ref 6–12)
POTASSIUM SERPL-SCNC: 4.1 MMOL/L (ref 3.5–5.2)
PROT SERPL-MCNC: 6.9 G/DL (ref 6–8.5)
RBC # BLD AUTO: 5.02 10*6/MM3 (ref 4.14–5.8)
SODIUM SERPL-SCNC: 141 MMOL/L (ref 136–145)
TRIGL SERPL-MCNC: 53 MG/DL (ref 0–150)
VLDLC SERPL-MCNC: 12 MG/DL (ref 5–40)
WBC # BLD AUTO: 6.69 10*3/MM3 (ref 3.4–10.8)

## 2021-08-31 PROCEDURE — 36415 COLL VENOUS BLD VENIPUNCTURE: CPT

## 2021-08-31 PROCEDURE — 83036 HEMOGLOBIN GLYCOSYLATED A1C: CPT

## 2021-08-31 PROCEDURE — 80053 COMPREHEN METABOLIC PANEL: CPT

## 2021-08-31 PROCEDURE — 80061 LIPID PANEL: CPT

## 2021-08-31 PROCEDURE — 85027 COMPLETE CBC AUTOMATED: CPT

## 2021-09-01 ENCOUNTER — TELEPHONE (OUTPATIENT)
Dept: FAMILY MEDICINE CLINIC | Age: 41
End: 2021-09-01

## 2021-09-01 DIAGNOSIS — N28.9 FUNCTION KIDNEY DECREASED: Primary | ICD-10-CM

## 2021-09-01 DIAGNOSIS — R79.89 ELEVATED LFTS: Primary | ICD-10-CM

## 2021-09-01 NOTE — TELEPHONE ENCOUNTER
Caller: Robin Patel    Relationship to patient: Self    Best call back number: [phone]     Patient is needing: PATIENT WAS RETURNING A CALL BACK TO [facility]. PLEASE CALL AND ADVISE.       UNABLE TO WARM TRANSFER.

## 2021-09-13 NOTE — PROGRESS NOTES
"Chief Complaint  Hypertension (follow up)    Subjective          Robin Patel presents to [facility]  He returns for follow-up of high blood pressure.  He has been out of his medication for at least 6 months.  He has been taking metoprolol 25 mg daily and lisinopril 10 mg daily.  Labs show that he was prediabetic and his liver enzymes are increased.  He reports that he eats a lot of fast food.  He has been cutting back on his sugar intake.  He doesn't use a lot of apap.  He rarely drinks etoh.  He has been referred to a GI specialist for his elevated liver enzymes.  His kidney function was also decreased with his labs, so an order was placed for him to recheck his kidney function.      Objective   Vital Signs:   /73 (BP Location: Left arm, Patient Position: Sitting)   Pulse 76   Ht 188 cm (74\")   Wt (!) 137 kg (301 lb 6.4 oz)   BMI 38.70 kg/m²     Physical Exam  Constitutional:       General: He is not in acute distress.     Appearance: Normal appearance. He is obese.   HENT:      Head: Normocephalic.   Eyes:      Pupils: Pupils are equal, round, and reactive to light.      Visual Fields: Right eye visual fields normal and left eye visual fields normal.   Neck:      Trachea: Trachea normal.   Cardiovascular:      Rate and Rhythm: Normal rate and regular rhythm.      Heart sounds: Normal heart sounds.   Pulmonary:      Effort: Pulmonary effort is normal.      Breath sounds: Normal breath sounds and air entry.   Musculoskeletal:      Right lower leg: No edema.      Left lower leg: No edema.   Skin:     General: Skin is warm and dry.   Neurological:      Mental Status: He is alert and oriented to person, place, and time.   Psychiatric:         Mood and Affect: Mood and affect normal.         Behavior: Behavior normal.         Thought Content: Thought content normal.        Result Review :                 Assessment and Plan    Diagnoses and all orders for this visit:    1. " Essential hypertension (Primary)  Assessment & Plan:  Hypertension is improving with treatment.  Continue current treatment regimen.  Dietary sodium restriction.  Weight loss.  Regular aerobic exercise.  Continue current medications.  Blood pressure will be reassessed 6 months.      2. Elevated LFTs  Assessment & Plan:  Has appt w/ ALEXANDRA Chandler in October      3. Function kidney decreased  Assessment & Plan:  Reports that he didn't have anything to drink except sips of water on the day of his lab draw.  May be due to dehydration.  Will recheck a BMP.        Follow Up   Return in about 6 months (around 3/14/2022).  Patient was given instructions and counseling regarding his condition or for health maintenance advice. Please see specific information pulled into the AVS if appropriate.

## 2021-09-13 NOTE — PATIENT INSTRUCTIONS
Nonalcoholic Fatty Liver Disease Diet, Adult  Nonalcoholic fatty liver disease is a condition that causes fat to build up in and around the liver. The disease makes it harder for the liver to work the way that it should. Following a healthy diet can help to keep nonalcoholic fatty liver disease under control. It can also help to prevent or improve conditions that are associated with the disease, such as heart disease, diabetes, high blood pressure, and abnormal cholesterol levels.  Along with regular exercise, this diet:  · Promotes weight loss.  · Helps to control blood sugar levels.  · Helps to improve the way that the body uses insulin.  What are tips for following this plan?  Reading food labels  Always check food labels for:  · The amount of saturated fat in a food. You should limit your intake of saturated fat. Saturated fat is found in foods that come from animals, including meat and dairy products such as butter, cheese, and whole milk.  · The amount of fiber in a food. You should choose high-fiber foods such as fruits, vegetables, and whole grains. Try to get 25-30 grams (g) of fiber a day.    Cooking  · When cooking, use heart-healthy oils that are high in monounsaturated fats. These include olive oil, canola oil, and avocado oil.  · Limit frying or deep-frying foods. Cook foods using healthy methods such as baking, boiling, steaming, and grilling instead.  Meal planning  · You may want to keep track of how many calories you take in. Eating the right amount of calories will help you achieve a healthy weight. Meeting with a registered dietitian can help you get started.  · Limit how often you eat takeout and fast food. These foods are usually very high in fat, salt, and sugar.  · Use the glycemic index (GI) to plan your meals. The index tells you how quickly a food will raise your blood sugar. Choose low-GI foods (GI less than 55). These foods take a longer time to raise blood sugar. A registered dietitian  can help you identify foods lower on the GI scale.  Lifestyle  · You may want to follow a Mediterranean diet. This diet includes a lot of vegetables, lean meats or fish, whole grains, fruits, and healthy oils and fats.  What foods can I eat?    Fruits  Bananas. Apples. Oranges. Grapes. Papaya. Ellis. Pomegranate. Kiwi. Grapefruit. Cherries.  Vegetables  Lettuce. Spinach. Peas. Beets. Cauliflower. Cabbage. Broccoli. Carrots. Tomatoes. Squash. Eggplant. Herbs. Peppers. Onions. Cucumbers. Merkel sprouts. Yams and sweet potatoes. Beans. Lentils.  Grains  Whole wheat or whole-grain foods, including breads, crackers, cereals, and pasta. Stone-ground whole wheat. Unsweetened oatmeal. Bulgur. Barley. Quinoa. Brown or wild rice. Corn or whole wheat flour tortillas.  Meats and other proteins  Lean meats. Poultry. Tofu. Seafood and shellfish.  Dairy  Low-fat or fat-free dairy products, such as yogurt, cottage cheese, or cheese.  Beverages  Water. Sugar-free drinks. Tea. Coffee. Low-fat or skim milk. Milk alternatives, such as soy or almond milk. Real fruit juice.  Fats and oils  Avocado. Canola or olive oil. Nuts and nut butters. Seeds.  Seasonings and condiments  Mustard. Relish. Low-fat, low-sugar ketchup and barbecue sauce. Low-fat or fat-free mayonnaise.  Sweets and desserts  Sugar-free sweets.  The items listed above may not be a complete list of foods and beverages you can eat. Contact a dietitian for more information.  What foods should I limit or avoid?  Meats and other proteins  Limit red meat to 1-2 times a week.  Dairy  Full-fat dairy.  Fats and oils  Palm oil and coconut oil. Fried foods.  Other foods  Processed foods. Foods that contain a lot of salt or sodium.  Sweets and desserts  Sweets that contain sugar.  Beverages  Sweetened drinks, such as sweet tea, milkshakes, iced sweet drinks, and sodas. Alcohol.  The items listed above may not be a complete list of foods and beverages you should avoid. Contact a  dietitian for more information.  Where to find more information  The National [place] of Diabetes and Digestive and Kidney Diseases: niddk.nih.gov  Summary  · Nonalcoholic fatty liver disease is a condition that causes fat to build up in and around the liver.  · Following a healthy diet can help to keep nonalcoholic fatty liver disease under control. Your diet should be rich in fruits, vegetables, whole grains, and lean proteins.  · Limit your intake of saturated fat. Saturated fat is found in foods that come from animals, including meat and dairy products such as butter, cheese, and whole milk.  · This diet promotes weight loss, helps to control blood sugar levels, and helps to improve the way that the body uses insulin.  This information is not intended to replace advice given to you by your health care provider. Make sure you discuss any questions you have with your health care provider.  Document Revised: 04/10/2020 Document Reviewed: 01/09/2020  Elsevier Patient Education © 2021 Elsevier Inc.

## 2021-09-14 ENCOUNTER — OFFICE VISIT (OUTPATIENT)
Dept: FAMILY MEDICINE CLINIC | Age: 41
End: 2021-09-14

## 2021-09-14 VITALS
SYSTOLIC BLOOD PRESSURE: 126 MMHG | WEIGHT: 301.4 LBS | BODY MASS INDEX: 38.68 KG/M2 | DIASTOLIC BLOOD PRESSURE: 73 MMHG | HEART RATE: 76 BPM | HEIGHT: 74 IN

## 2021-09-14 DIAGNOSIS — N28.9 FUNCTION KIDNEY DECREASED: ICD-10-CM

## 2021-09-14 DIAGNOSIS — I10 ESSENTIAL HYPERTENSION: Primary | ICD-10-CM

## 2021-09-14 DIAGNOSIS — R79.89 ELEVATED LFTS: ICD-10-CM

## 2021-09-14 PROCEDURE — 99213 OFFICE O/P EST LOW 20 MIN: CPT | Performed by: NURSE PRACTITIONER

## 2021-09-14 RX ORDER — LEVOCETIRIZINE DIHYDROCHLORIDE 5 MG/1
5 TABLET, FILM COATED ORAL EVERY EVENING
COMMUNITY

## 2021-09-14 NOTE — ASSESSMENT & PLAN NOTE
Reports that he didn't have anything to drink except sips of water on the day of his lab draw.  May be due to dehydration.  Will recheck a BMP.

## 2021-09-28 ENCOUNTER — LAB (OUTPATIENT)
Dept: LAB | Facility: HOSPITAL | Age: 41
End: 2021-09-28

## 2021-09-28 ENCOUNTER — HOSPITAL ENCOUNTER (OUTPATIENT)
Dept: ULTRASOUND IMAGING | Facility: HOSPITAL | Age: 41
Discharge: HOME | End: 2021-09-28

## 2021-09-28 DIAGNOSIS — R79.89 ELEVATED LFTS: ICD-10-CM

## 2021-09-28 DIAGNOSIS — N28.9 FUNCTION KIDNEY DECREASED: ICD-10-CM

## 2021-09-28 LAB
ANION GAP SERPL CALCULATED.3IONS-SCNC: 11.6 MMOL/L (ref 5–15)
BUN SERPL-MCNC: 14 MG/DL (ref 6–20)
BUN/CREAT SERPL: 11.4 (ref 7–25)
CALCIUM SPEC-SCNC: 9.2 MG/DL (ref 8.6–10.5)
CHLORIDE SERPL-SCNC: 105 MMOL/L (ref 98–107)
CO2 SERPL-SCNC: 24.4 MMOL/L (ref 22–29)
CREAT SERPL-MCNC: 1.23 MG/DL (ref 0.76–1.27)
GFR SERPL CREATININE-BSD FRML MDRD: 65 ML/MIN/1.73
GLUCOSE SERPL-MCNC: 107 MG/DL (ref 65–99)
POTASSIUM SERPL-SCNC: 3.6 MMOL/L (ref 3.5–5.2)
SODIUM SERPL-SCNC: 141 MMOL/L (ref 136–145)

## 2021-09-28 PROCEDURE — 80048 BASIC METABOLIC PNL TOTAL CA: CPT

## 2021-09-28 PROCEDURE — 76705 ECHO EXAM OF ABDOMEN: CPT

## 2021-09-28 PROCEDURE — 36415 COLL VENOUS BLD VENIPUNCTURE: CPT

## 2021-10-18 ENCOUNTER — OFFICE VISIT (OUTPATIENT)
Dept: GASTROENTEROLOGY | Facility: CLINIC | Age: 41
End: 2021-10-18

## 2021-10-18 VITALS
SYSTOLIC BLOOD PRESSURE: 136 MMHG | HEART RATE: 100 BPM | RESPIRATION RATE: 16 BRPM | DIASTOLIC BLOOD PRESSURE: 88 MMHG | WEIGHT: 295 LBS | BODY MASS INDEX: 37.86 KG/M2 | HEIGHT: 74 IN

## 2021-10-18 DIAGNOSIS — R16.0 HEPATOMEGALY: ICD-10-CM

## 2021-10-18 DIAGNOSIS — K76.0 FATTY LIVER: Primary | ICD-10-CM

## 2021-10-18 DIAGNOSIS — R74.8 ELEVATED LIVER ENZYMES: ICD-10-CM

## 2021-10-18 PROCEDURE — 99214 OFFICE O/P EST MOD 30 MIN: CPT | Performed by: NURSE PRACTITIONER

## 2021-10-18 NOTE — PROGRESS NOTES
Patient Name: Robin Patel   Visit Date: 10/18/2021   Patient ID: [id number]  Provider: MARISOL Cantu    Sex: male  Location:  Location Address:  Location Phone: [phone] [facility] [place]  [facility] [address] [phone]    YOB: 1980      Primary Care Provider Shahida Naidu APRN      Referring Provider: Shahida Arenas*        Chief Complaint  Elevated Hepatic Enzymes (Labs and US)    History of Present Illness  Robin Patel is a 41 y.o. who presents to [facility] on referral from Shahida Arenas* for a gastroenterology evaluation of Elevated Hepatic Enzymes (Labs and US)     Admits to alcohol intake approx once a month, 1-2 beers. Denies any unprofessional tattoos, hx of illicit drugs, or hx of blood transfusion. Denies any abdominal pain, ascites, confusion, or swelling. Appetite and weight stable.     Bowel movement once daily, formed stool.  Denies any hematochezia or melena.    Expresses that he is trying to make lifestyle changes by avoiding fast food and drinking less sugary drinks.  Plans to start incorporating exercise soon.    Labs Result Review Imaging    Past Medical History:   Diagnosis Date   • Allergic    • Hypertension        History reviewed. No pertinent surgical history.      Current Outpatient Medications:   •  levocetirizine (XYZAL) 5 MG tablet, Take 5 mg by mouth Every Evening., Disp: , Rfl:   •  lisinopril (PRINIVIL,ZESTRIL) 10 MG tablet, Take 1 tablet by mouth Daily, Disp: 90 tablet, Rfl: 0  •  metoprolol succinate XL (Toprol XL) 25 MG 24 hr tablet, Take 1 tablet by mouth Daily, Disp: 90 tablet, Rfl: 0     No Known Allergies    Family History   Problem Relation Age of Onset   • Breast cancer Sister    • Cancer Maternal Grandmother         Social History     Social History Narrative    Single, 0 children; Works at [employer]         Objective     Review of Systems   Constitutional: Negative  "for appetite change and unexpected weight loss.   Gastrointestinal: Negative for abdominal distention and abdominal pain.        Vital Signs:   /88 (BP Location: Right arm, Patient Position: Sitting, Cuff Size: Adult)   Pulse 100   Resp 16   Ht 188 cm (74\")   Wt 134 kg (295 lb)   BMI 37.88 kg/m²       Physical Exam  Constitutional:       General: He is not in acute distress.     Appearance: Normal appearance. He is well-developed. He is obese.   HENT:      Head: Normocephalic and atraumatic.   Eyes:      Conjunctiva/sclera: Conjunctivae normal.      Pupils: Pupils are equal, round, and reactive to light.      Visual Fields: Right eye visual fields normal and left eye visual fields normal.   Cardiovascular:      Rate and Rhythm: Normal rate and regular rhythm.      Heart sounds: Normal heart sounds.   Pulmonary:      Effort: Pulmonary effort is normal. No retractions.      Breath sounds: Normal breath sounds and air entry.   Abdominal:      General: Bowel sounds are normal.      Palpations: Abdomen is soft.      Tenderness: There is no abdominal tenderness.      Comments: No appreciable hepatosplenomegaly   Musculoskeletal:      Cervical back: Neck supple.      Right lower leg: No edema.      Left lower leg: No edema.   Lymphadenopathy:      Cervical: No cervical adenopathy.   Skin:     Findings: No lesion.   Neurological:      Mental Status: He is alert and oriented to person, place, and time.   Psychiatric:         Mood and Affect: Mood and affect normal.         Result Review :   The following data was reviewed by: MARISOL Cantu on 10/18/2021:  CMP    CMP 8/31/21 9/28/21   Glucose 117 (A) 107 (A)   BUN 14 14   Creatinine 1.32 (A) 1.23   eGFR Non African Am 60 (A) 65   Sodium 141 141   Potassium 4.1 3.6   Chloride 103 105   Calcium 9.3 9.2   Albumin 4.20    Total Bilirubin 0.6    Alkaline Phosphatase 64    AST (SGOT) 23    ALT (SGPT) 54 (A)    (A) Abnormal value            CBC w/diff    CBC " w/Diff 8/31/21   WBC 6.69   RBC 5.02   Hemoglobin 15.5   Hematocrit 45.2   MCV 90.0   MCH 30.9   MCHC 34.3   RDW 12.2 (A)   Platelets 199   (A) Abnormal value            No results found for: IRON, TIBC, FERRITIN, LABIRON      Abdominal ultrasound 9/1/2021: Hepatomegaly.  Liver measures 19 cm in length.  Hepatic steatosis is suggested.     Assessment and Plan    Diagnoses and all orders for this visit:    1. Fatty liver (Primary)  -     Iron Profile; Future  -     Ferritin  -     MIKEY  -     Mitochondrial Antibodies, M2  -     Anti-Smooth Muscle Antibody Titer  -     Immunofixation, Serum; Future  -     Alpha - 1 - Antitrypsin  -     Protime-INR  -     Hepatitis Panel, Acute  -     JOHNSON Fibrosure  -     Comprehensive Metabolic Panel  -     CBC & Differential    2. Elevated liver enzymes  -     Iron Profile; Future  -     Ferritin  -     MIKEY  -     Mitochondrial Antibodies, M2  -     Anti-Smooth Muscle Antibody Titer  -     Immunofixation, Serum; Future  -     Alpha - 1 - Antitrypsin  -     Protime-INR  -     Hepatitis Panel, Acute  -     JOHNSON Fibrosure  -     Comprehensive Metabolic Panel  -     CBC & Differential    3. Hepatomegaly  -     Iron Profile; Future  -     Ferritin  -     MIKEY  -     Mitochondrial Antibodies, M2  -     Anti-Smooth Muscle Antibody Titer  -     Immunofixation, Serum; Future  -     Alpha - 1 - Antitrypsin  -     Protime-INR  -     Hepatitis Panel, Acute  -     JOHNSON Fibrosure  -     Comprehensive Metabolic Panel  -     CBC & Differential          Follow Up   Return in about 3 months (around 1/18/2022).  Patient was given instructions and counseling regarding his condition or for health maintenance advice. Please see specific information pulled into the AVS if appropriate.

## 2021-10-26 ENCOUNTER — LAB (OUTPATIENT)
Dept: LAB | Facility: HOSPITAL | Age: 41
End: 2021-10-26

## 2021-10-26 DIAGNOSIS — K76.0 FATTY LIVER: ICD-10-CM

## 2021-10-26 DIAGNOSIS — R16.0 HEPATOMEGALY: ICD-10-CM

## 2021-10-26 DIAGNOSIS — R74.8 ELEVATED LIVER ENZYMES: ICD-10-CM

## 2021-10-26 LAB
ALBUMIN SERPL-MCNC: 4.3 G/DL (ref 3.5–5.2)
ALBUMIN/GLOB SERPL: 1.7 G/DL
ALP SERPL-CCNC: 53 U/L (ref 39–117)
ALPHA1 GLOB MFR UR ELPH: 148 MG/DL (ref 90–200)
ALT SERPL W P-5'-P-CCNC: 46 U/L (ref 1–41)
ANION GAP SERPL CALCULATED.3IONS-SCNC: 7.9 MMOL/L (ref 5–15)
AST SERPL-CCNC: 25 U/L (ref 1–40)
BASOPHILS # BLD AUTO: 0.02 10*3/MM3 (ref 0–0.2)
BASOPHILS NFR BLD AUTO: 0.3 % (ref 0–1.5)
BILIRUB SERPL-MCNC: 0.6 MG/DL (ref 0–1.2)
BUN SERPL-MCNC: 15 MG/DL (ref 6–20)
BUN/CREAT SERPL: 11.4 (ref 7–25)
CALCIUM SPEC-SCNC: 9.3 MG/DL (ref 8.6–10.5)
CHLORIDE SERPL-SCNC: 104 MMOL/L (ref 98–107)
CO2 SERPL-SCNC: 27.1 MMOL/L (ref 22–29)
CREAT SERPL-MCNC: 1.32 MG/DL (ref 0.76–1.27)
DEPRECATED RDW RBC AUTO: 40.7 FL (ref 37–54)
EOSINOPHIL # BLD AUTO: 0.05 10*3/MM3 (ref 0–0.4)
EOSINOPHIL NFR BLD AUTO: 0.8 % (ref 0.3–6.2)
ERYTHROCYTE [DISTWIDTH] IN BLOOD BY AUTOMATED COUNT: 12.3 % (ref 12.3–15.4)
FERRITIN SERPL-MCNC: 276 NG/ML (ref 30–400)
GFR SERPL CREATININE-BSD FRML MDRD: 60 ML/MIN/1.73
GLOBULIN UR ELPH-MCNC: 2.5 GM/DL
GLUCOSE SERPL-MCNC: 106 MG/DL (ref 65–99)
HAV IGM SERPL QL IA: NORMAL
HBV CORE IGM SERPL QL IA: NORMAL
HBV SURFACE AG SERPL QL IA: NORMAL
HCT VFR BLD AUTO: 44.1 % (ref 37.5–51)
HCV AB SER DONR QL: NORMAL
HGB BLD-MCNC: 15.2 G/DL (ref 13–17.7)
IMM GRANULOCYTES # BLD AUTO: 0.01 10*3/MM3 (ref 0–0.05)
IMM GRANULOCYTES NFR BLD AUTO: 0.2 % (ref 0–0.5)
INR PPP: 1 (ref 2–3)
IRON 24H UR-MRATE: 101 MCG/DL (ref 59–158)
IRON SATN MFR SERPL: 29 % (ref 20–50)
LYMPHOCYTES # BLD AUTO: 1.27 10*3/MM3 (ref 0.7–3.1)
LYMPHOCYTES NFR BLD AUTO: 20.7 % (ref 19.6–45.3)
MCH RBC QN AUTO: 31.1 PG (ref 26.6–33)
MCHC RBC AUTO-ENTMCNC: 34.5 G/DL (ref 31.5–35.7)
MCV RBC AUTO: 90.2 FL (ref 79–97)
MONOCYTES # BLD AUTO: 0.45 10*3/MM3 (ref 0.1–0.9)
MONOCYTES NFR BLD AUTO: 7.3 % (ref 5–12)
NEUTROPHILS NFR BLD AUTO: 4.34 10*3/MM3 (ref 1.7–7)
NEUTROPHILS NFR BLD AUTO: 70.7 % (ref 42.7–76)
PLATELET # BLD AUTO: 177 10*3/MM3 (ref 140–450)
PMV BLD AUTO: 10.4 FL (ref 6–12)
POTASSIUM SERPL-SCNC: 3.9 MMOL/L (ref 3.5–5.2)
PROT SERPL-MCNC: 6.8 G/DL (ref 6–8.5)
PROTHROMBIN TIME: 11 SECONDS (ref 9.4–12)
RBC # BLD AUTO: 4.89 10*6/MM3 (ref 4.14–5.8)
SODIUM SERPL-SCNC: 139 MMOL/L (ref 136–145)
TIBC SERPL-MCNC: 344 MCG/DL (ref 298–536)
TRANSFERRIN SERPL-MCNC: 231 MG/DL (ref 200–360)
WBC # BLD AUTO: 6.14 10*3/MM3 (ref 3.4–10.8)

## 2021-10-26 PROCEDURE — 82784 ASSAY IGA/IGD/IGG/IGM EACH: CPT

## 2021-10-26 PROCEDURE — 82103 ALPHA-1-ANTITRYPSIN TOTAL: CPT | Performed by: NURSE PRACTITIONER

## 2021-10-26 PROCEDURE — 82728 ASSAY OF FERRITIN: CPT | Performed by: NURSE PRACTITIONER

## 2021-10-26 PROCEDURE — 36415 COLL VENOUS BLD VENIPUNCTURE: CPT | Performed by: NURSE PRACTITIONER

## 2021-10-26 PROCEDURE — 83883 ASSAY NEPHELOMETRY NOT SPEC: CPT | Performed by: NURSE PRACTITIONER

## 2021-10-26 PROCEDURE — 86225 DNA ANTIBODY NATIVE: CPT | Performed by: NURSE PRACTITIONER

## 2021-10-26 PROCEDURE — 83516 IMMUNOASSAY NONANTIBODY: CPT | Performed by: NURSE PRACTITIONER

## 2021-10-26 PROCEDURE — 86038 ANTINUCLEAR ANTIBODIES: CPT | Performed by: NURSE PRACTITIONER

## 2021-10-26 PROCEDURE — 83540 ASSAY OF IRON: CPT

## 2021-10-26 PROCEDURE — 80074 ACUTE HEPATITIS PANEL: CPT | Performed by: NURSE PRACTITIONER

## 2021-10-26 PROCEDURE — 86334 IMMUNOFIX E-PHORESIS SERUM: CPT

## 2021-10-26 PROCEDURE — 82977 ASSAY OF GGT: CPT | Performed by: NURSE PRACTITIONER

## 2021-10-26 PROCEDURE — 82172 ASSAY OF APOLIPOPROTEIN: CPT | Performed by: NURSE PRACTITIONER

## 2021-10-26 PROCEDURE — 85025 COMPLETE CBC W/AUTO DIFF WBC: CPT | Performed by: NURSE PRACTITIONER

## 2021-10-26 PROCEDURE — 83010 ASSAY OF HAPTOGLOBIN QUANT: CPT | Performed by: NURSE PRACTITIONER

## 2021-10-26 PROCEDURE — 84466 ASSAY OF TRANSFERRIN: CPT

## 2021-10-26 PROCEDURE — 84478 ASSAY OF TRIGLYCERIDES: CPT | Performed by: NURSE PRACTITIONER

## 2021-10-26 PROCEDURE — 82465 ASSAY BLD/SERUM CHOLESTEROL: CPT | Performed by: NURSE PRACTITIONER

## 2021-10-26 PROCEDURE — 80053 COMPREHEN METABOLIC PANEL: CPT | Performed by: NURSE PRACTITIONER

## 2021-10-26 PROCEDURE — 85610 PROTHROMBIN TIME: CPT | Performed by: NURSE PRACTITIONER

## 2021-10-27 ENCOUNTER — PATIENT ROUNDING (BHMG ONLY) (OUTPATIENT)
Dept: GASTROENTEROLOGY | Facility: CLINIC | Age: 41
End: 2021-10-27

## 2021-10-27 LAB
ACTIN IGG SERPL-ACNC: 11 UNITS (ref 0–19)
IGA SERPL-MCNC: 330 MG/DL (ref 90–386)
IGG SERPL-MCNC: 939 MG/DL (ref 603–1613)
IGM SERPL-MCNC: 42 MG/DL (ref 20–172)
MITOCHONDRIA M2 IGG SER-ACNC: <20 UNITS (ref 0–20)
PROT PATTERN SERPL IFE-IMP: NORMAL

## 2021-10-27 NOTE — PROGRESS NOTES
October 27, 2021    Hello, may I speak with Robin Patel?    My name is Kika Black    I am  with [facility]  [address] [address] [phone].    Before we get started may I verify your date of birth? 1980    I am calling to officially welcome you to our practice and ask about your recent visit. Is this a good time to talk? Yes    Tell me about your visit with us. What things went well?  It all went well, she actually advised me that I wasn't as sick as I thought I was. I was relieved     We're always looking for ways to make our patients' experiences even better. Do you have recommendations on ways we may improve?  No everything went well    Overall were you satisfied with your first visit to our practice? Yes       I appreciate you taking the time to speak with me today. Is there anything else I can do for you? No      Thank you, and have a great day.

## 2021-10-28 LAB
A2 MACROGLOB SERPL-MCNC: 112 MG/DL (ref 110–276)
ALT SERPL W P-5'-P-CCNC: 53 IU/L (ref 0–55)
APO A-I SERPL-MCNC: 116 MG/DL (ref 101–178)
AST SERPL W P-5'-P-CCNC: 23 IU/L (ref 0–40)
BILIRUB SERPL-MCNC: 0.5 MG/DL (ref 0–1.2)
CHOLEST SERPL-MCNC: 136 MG/DL (ref 100–199)
DSDNA IGG SERPL IA-ACNC: NEGATIVE [IU]/ML
FIBROSIS SCORING:: ABNORMAL
FIBROSIS STAGE SERPL QL: ABNORMAL
GGT SERPL-CCNC: 27 IU/L (ref 0–65)
GLUCOSE SERPL-MCNC: 106 MG/DL (ref 65–99)
HAPTOGLOB SERPL-MCNC: 100 MG/DL (ref 17–317)
INTERPRETATIONS: (REFERENCE): ABNORMAL
LABORATORY COMMENT REPORT: ABNORMAL
LIVER FIBR SCORE SERPL CALC.FIBROSURE: 0.1 (ref 0–0.21)
NASH SCORING (REFERENCE): ABNORMAL
NECROINFLAMMATORY ACT GRADE SERPL QL: ABNORMAL
NECROINFLAMMATORY ACT SCORE SERPL: 0.5
NUCLEAR IGG SER IA-RTO: NEGATIVE
SERVICE CMNT-IMP: ABNORMAL
STEATOSIS GRADE (REFERENCE): ABNORMAL
STEATOSIS GRADING (REFERENCE): ABNORMAL
STEATOSIS SCORE (REFERENCE): 0.59 (ref 0–0.3)
TRIGL SERPL-MCNC: 71 MG/DL (ref 0–149)

## 2021-11-09 DIAGNOSIS — I10 ESSENTIAL HYPERTENSION: ICD-10-CM

## 2021-11-09 DIAGNOSIS — R00.0 TACHYCARDIA: ICD-10-CM

## 2021-11-09 RX ORDER — LISINOPRIL 10 MG/1
10 TABLET ORAL DAILY
Qty: 90 TABLET | Refills: 0 | Status: SHIPPED | OUTPATIENT
Start: 2021-11-09 | End: 2022-03-11 | Stop reason: SDUPTHER

## 2021-11-09 RX ORDER — METOPROLOL SUCCINATE 25 MG/1
25 TABLET, EXTENDED RELEASE ORAL DAILY
Qty: 90 TABLET | Refills: 0 | Status: SHIPPED | OUTPATIENT
Start: 2021-11-09 | End: 2022-03-11 | Stop reason: SDUPTHER

## 2021-12-07 NOTE — PROGRESS NOTES
"Chief Complaint  Hypertension (follow up)    Subjective          Robin Patel presents to [facility]  Patient returns for follow-up.    HTN: He is taking lisinopril 10 mg daily.  He has lost 10 lbs in 3 months.  He has cut out sugary drinks and is trying to eat more chicken.  He is a former smoker.     Elevated LFTs: He has been following with Alex Chandler and kylie.  He has bought a Vit E supplement, but hasn't taken it.      Decreased kidney function: His creatinine has been fluctuating from normal to slightly above normal.      Objective   Vital Signs:   /76 (BP Location: Left arm, Patient Position: Sitting)   Pulse 79   Ht 188 cm (74\")   Wt 132 kg (291 lb 3.2 oz)   BMI 37.39 kg/m²     Physical Exam  Constitutional:       General: He is not in acute distress.     Appearance: Normal appearance. He is obese.   HENT:      Head: Normocephalic.   Eyes:      Pupils: Pupils are equal, round, and reactive to light.      Visual Fields: Right eye visual fields normal and left eye visual fields normal.   Neck:      Trachea: Trachea normal.   Cardiovascular:      Rate and Rhythm: Normal rate and regular rhythm.      Heart sounds: Normal heart sounds.   Pulmonary:      Effort: Pulmonary effort is normal.      Breath sounds: Normal breath sounds and air entry.   Musculoskeletal:      Right lower leg: No edema.      Left lower leg: No edema.   Skin:     General: Skin is warm and dry.   Neurological:      Mental Status: He is alert and oriented to person, place, and time.   Psychiatric:         Mood and Affect: Mood and affect normal.         Behavior: Behavior normal.         Thought Content: Thought content normal.        Result Review :   The following data was reviewed by: MARISOL Fowler on 12/14/2021:  CBC & Differential (10/26/2021 10:28)  Comprehensive Metabolic Panel (10/26/2021 10:28)                     Assessment and Plan    Diagnoses and all orders for this " visit:    1. Essential hypertension (Primary)  Assessment & Plan:  Hypertension is improving with treatment.  Continue current treatment regimen.  Weight loss.  Regular aerobic exercise.  Continue current medications.  Blood pressure will be reassessed at the next regular appointment.      2. Elevated LFTs  Assessment & Plan:  Continue weight loss.  Recommended Vit E and coffee consumption      3. Function kidney decreased  Assessment & Plan:  Will check in 6 months      4. Class 2 obesity due to excess calories with body mass index (BMI) of 39.0 to 39.9 in adult, unspecified whether serious comorbidity present        Follow Up   Return in about 6 months (around 6/14/2022).  Patient was given instructions and counseling regarding his condition or for health maintenance advice. Please see specific information pulled into the AVS if appropriate.

## 2021-12-14 ENCOUNTER — OFFICE VISIT (OUTPATIENT)
Dept: FAMILY MEDICINE CLINIC | Age: 41
End: 2021-12-14

## 2021-12-14 VITALS
DIASTOLIC BLOOD PRESSURE: 76 MMHG | HEART RATE: 79 BPM | HEIGHT: 74 IN | SYSTOLIC BLOOD PRESSURE: 120 MMHG | BODY MASS INDEX: 37.37 KG/M2 | WEIGHT: 291.2 LBS

## 2021-12-14 DIAGNOSIS — I10 ESSENTIAL HYPERTENSION: Primary | ICD-10-CM

## 2021-12-14 DIAGNOSIS — R79.89 ELEVATED LFTS: ICD-10-CM

## 2021-12-14 DIAGNOSIS — N28.9 FUNCTION KIDNEY DECREASED: ICD-10-CM

## 2021-12-14 DIAGNOSIS — E66.09 CLASS 2 OBESITY DUE TO EXCESS CALORIES WITH BODY MASS INDEX (BMI) OF 39.0 TO 39.9 IN ADULT, UNSPECIFIED WHETHER SERIOUS COMORBIDITY PRESENT: ICD-10-CM

## 2021-12-14 PROCEDURE — 99214 OFFICE O/P EST MOD 30 MIN: CPT | Performed by: NURSE PRACTITIONER

## 2021-12-14 NOTE — PATIENT INSTRUCTIONS
Nonalcoholic Fatty Liver Disease Diet, Adult  Nonalcoholic fatty liver disease is a condition that causes fat to build up in and around the liver. The disease makes it harder for the liver to work the way that it should. Following a healthy diet can help to keep nonalcoholic fatty liver disease under control. It can also help to prevent or improve conditions that are associated with the disease, such as heart disease, diabetes, high blood pressure, and abnormal cholesterol levels.  Along with regular exercise, this diet:  · Promotes weight loss.  · Helps to control blood sugar levels.  · Helps to improve the way that the body uses insulin.  What are tips for following this plan?  Reading food labels  Always check food labels for:  · The amount of saturated fat in a food. You should limit your intake of saturated fat. Saturated fat is found in foods that come from animals, including meat and dairy products such as butter, cheese, and whole milk.  · The amount of fiber in a food. You should choose high-fiber foods such as fruits, vegetables, and whole grains. Try to get 25-30 grams (g) of fiber a day.    Cooking  · When cooking, use heart-healthy oils that are high in monounsaturated fats. These include olive oil, canola oil, and avocado oil.  · Limit frying or deep-frying foods. Cook foods using healthy methods such as baking, boiling, steaming, and grilling instead.  Meal planning  · You may want to keep track of how many calories you take in. Eating the right amount of calories will help you achieve a healthy weight. Meeting with a registered dietitian can help you get started.  · Limit how often you eat takeout and fast food. These foods are usually very high in fat, salt, and sugar.  · Use the glycemic index (GI) to plan your meals. The index tells you how quickly a food will raise your blood sugar. Choose low-GI foods (GI less than 55). These foods take a longer time to raise blood sugar. A registered dietitian  can help you identify foods lower on the GI scale.  Lifestyle  · You may want to follow a Mediterranean diet. This diet includes a lot of vegetables, lean meats or fish, whole grains, fruits, and healthy oils and fats.  What foods can I eat?    Fruits  Bananas. Apples. Oranges. Grapes. Papaya. [place]. Pomegranate. Kiwi. Grapefruit. Cherries.  Vegetables  Lettuce. Spinach. Peas. Beets. Cauliflower. Cabbage. Broccoli. Carrots. Tomatoes. Squash. Eggplant. Herbs. Peppers. Onions. Cucumbers. [place] sprouts. Yams and sweet potatoes. Beans. Lentils.  Grains  Whole wheat or whole-grain foods, including breads, crackers, cereals, and pasta. Stone-ground whole wheat. Unsweetened oatmeal. Bulgur. Barley. Quinoa. Brown or wild rice. Corn or whole wheat flour tortillas.  Meats and other proteins  Lean meats. Poultry. Tofu. Seafood and shellfish.  Dairy  Low-fat or fat-free dairy products, such as yogurt, cottage cheese, or cheese.  Beverages  Water. Sugar-free drinks. Tea. Coffee. Low-fat or skim milk. Milk alternatives, such as soy or almond milk. Real fruit juice.  Fats and oils  Avocado. Canola or olive oil. Nuts and nut butters. Seeds.  Seasonings and condiments  Mustard. Relish. Low-fat, low-sugar ketchup and barbecue sauce. Low-fat or fat-free mayonnaise.  Sweets and desserts  Sugar-free sweets.  The items listed above may not be a complete list of foods and beverages you can eat. Contact a dietitian for more information.  What foods should I limit or avoid?  Meats and other proteins  Limit red meat to 1-2 times a week.  Dairy  Full-fat dairy.  Fats and oils  Palm oil and coconut oil. Fried foods.  Other foods  Processed foods. Foods that contain a lot of salt or sodium.  Sweets and desserts  Sweets that contain sugar.  Beverages  Sweetened drinks, such as sweet tea, milkshakes, iced sweet drinks, and sodas. Alcohol.  The items listed above may not be a complete list of foods and beverages you should avoid. Contact a  dietitian for more information.  Where to find more information  The National [place] of Diabetes and Digestive and Kidney Diseases: niddk.nih.gov  Summary  · Nonalcoholic fatty liver disease is a condition that causes fat to build up in and around the liver.  · Following a healthy diet can help to keep nonalcoholic fatty liver disease under control. Your diet should be rich in fruits, vegetables, whole grains, and lean proteins.  · Limit your intake of saturated fat. Saturated fat is found in foods that come from animals, including meat and dairy products such as butter, cheese, and whole milk.  · This diet promotes weight loss, helps to control blood sugar levels, and helps to improve the way that the body uses insulin.  This information is not intended to replace advice given to you by your health care provider. Make sure you discuss any questions you have with your health care provider.  Document Revised: 04/10/2020 Document Reviewed: 01/09/2020  Elsevier Patient Education © 2021 Elsevier Inc.

## 2022-01-20 ENCOUNTER — OFFICE VISIT (OUTPATIENT)
Dept: GASTROENTEROLOGY | Facility: CLINIC | Age: 42
End: 2022-01-20

## 2022-01-20 VITALS
BODY MASS INDEX: 37.28 KG/M2 | HEIGHT: 74 IN | SYSTOLIC BLOOD PRESSURE: 143 MMHG | HEART RATE: 72 BPM | DIASTOLIC BLOOD PRESSURE: 72 MMHG | WEIGHT: 290.5 LBS

## 2022-01-20 DIAGNOSIS — K76.0 FATTY LIVER: Primary | ICD-10-CM

## 2022-01-20 PROCEDURE — 99212 OFFICE O/P EST SF 10 MIN: CPT | Performed by: NURSE PRACTITIONER

## 2022-01-20 NOTE — PATIENT INSTRUCTIONS
Fatty Liver Disease    Fatty liver disease occurs when too much fat has built up in your liver cells. Fatty liver disease is also called hepatic steatosis or steatohepatitis. The liver removes harmful substances from your bloodstream and produces fluids that your body needs. It also helps your body use and store energy from the food you eat.  In many cases, fatty liver disease does not cause symptoms or problems. It is often diagnosed when tests are being done for other reasons. However, over time, fatty liver can cause inflammation that may lead to more serious liver problems, such as scarring of the liver (cirrhosis) and liver failure.  Fatty liver is associated with insulin resistance, increased body fat, high blood pressure (hypertension), and high cholesterol. These are features of metabolic syndrome and increase your risk for stroke, diabetes, and heart disease.  What are the causes?  This condition may be caused by:  · Drinking too much alcohol.  · Poor nutrition.  · Obesity.  · Cushing's syndrome.  · Diabetes.  · High cholesterol.  · Certain drugs.  · Poisons.  · Some viral infections.  · Pregnancy.  What increases the risk?  You are more likely to develop this condition if you:  · Abuse alcohol.  · Are overweight.  · Have diabetes.  · Have hepatitis.  · Have a high triglyceride level.  · Are pregnant.  What are the signs or symptoms?  Fatty liver disease often does not cause symptoms. If symptoms do develop, they can include:  · Fatigue.  · Weakness.  · Weight loss.  · Confusion.  · Abdominal pain.  · Nausea and vomiting.  · Yellowing of your skin and the white parts of your eyes (jaundice).  · Itchy skin.  How is this diagnosed?  This condition may be diagnosed by:  · A physical exam and medical history.  · Blood tests.  · Imaging tests, such as an ultrasound, CT scan, or MRI.  · A liver biopsy. A small sample of liver tissue is removed using a needle. The sample is then looked at under a microscope.  How  is this treated?  Fatty liver disease is often caused by other health conditions. Treatment for fatty liver may involve medicines and lifestyle changes to manage conditions such as:  · Alcoholism.  · High cholesterol.  · Diabetes.  · Being overweight or obese.  Follow these instructions at home:    · Do not drink alcohol. If you have trouble quitting, ask your health care provider how to safely quit with the help of medicine or a supervised program. This is important to keep your condition from getting worse.  · Eat a healthy diet as told by your health care provider. Ask your health care provider about working with a diet and nutrition specialist (dietitian) to develop an eating plan.  · Exercise regularly. This can help you lose weight and control your cholesterol and diabetes. Talk to your health care provider about an exercise plan and which activities are best for you.  · Take over-the-counter and prescription medicines only as told by your health care provider.  · Keep all follow-up visits as told by your health care provider. This is important.  Contact a health care provider if:  You have trouble controlling your:  · Blood sugar. This is especially important if you have diabetes.  · Cholesterol.  · Drinking of alcohol.  Get help right away if:  · You have abdominal pain.  · You have jaundice.  · You have nausea and vomiting.  · You vomit blood or material that looks like coffee grounds.  · You have stools that are black, tar-like, or bloody.  Summary  · Fatty liver disease develops when too much fat builds up in the cells of your liver.  · Fatty liver disease often causes no symptoms or problems. However, over time, fatty liver can cause inflammation that may lead to more serious liver problems, such as scarring of the liver (cirrhosis).  · You are more likely to develop this condition if you abuse alcohol, are pregnant, are overweight, have diabetes, have hepatitis, or have high triglyceride  levels.  · Contact your health care provider if you have trouble controlling your weight, blood sugar, cholesterol, or drinking of alcohol.  This information is not intended to replace advice given to you by your health care provider. Make sure you discuss any questions you have with your health care provider.  Document Revised: 11/30/2018 Document Reviewed: 09/26/2018  [employer] Patient Education © 2021 Elsevier Inc.

## 2022-01-20 NOTE — PROGRESS NOTES
"  Chief Complaint  Follow-up (fatty liver)    History of Present Illness  Robin Patel is a 41 y.o. who presents to [facility] for follow up of Follow-up (fatty liver).    Mr. Patel presents today for follow-up of elevated liver enzymes.  Liver work-up negative for any autoimmune or infectious etiology.  [place] FibroSure was consistent with stage II steatosis and no fibrosis noted.  Patient has actively been trying to better his overall health.  Has cut out all sugary beverages.  Is trying to incorporate exercise as well however has not been able to ride his bike as much due to the cold weather.  Has lost 5 pounds since last visit.  He denies any ascites, abdominal pain, nausea, confusion, or dysphagia.        Labs Result Review Imaging    Past Medical History:   Diagnosis Date   • Allergic    • Fatty liver    • Hypertension        History reviewed. No pertinent surgical history.    Current Outpatient Medications on File Prior to Visit   Medication Sig Dispense Refill   • levocetirizine (XYZAL) 5 MG tablet Take 5 mg by mouth Every Evening.     • lisinopril (PRINIVIL,ZESTRIL) 10 MG tablet Take 1 tablet by mouth Daily 90 tablet 0   • metoprolol succinate XL (Toprol XL) 25 MG 24 hr tablet Take 1 tablet by mouth Daily 90 tablet 0     No current facility-administered medications on file prior to visit.       Social History     Social History Narrative    Single, 0 children; Works at [employer]         Objective     Review of Systems   Constitutional: Positive for appetite change.   Gastrointestinal: Negative for abdominal pain.        Vital Signs:   /72 (BP Location: Right arm, Patient Position: Sitting, Cuff Size: Adult)   Pulse 72   Ht 188 cm (74\")   Wt 132 kg (290 lb 8 oz)   BMI 37.30 kg/m²       Physical Exam  Constitutional:       General: He is not in acute distress.     Appearance: Normal appearance. He is well-developed and normal weight.   HENT:      Head: " Normocephalic and atraumatic.   Eyes:      Conjunctiva/sclera: Conjunctivae normal.      Pupils: Pupils are equal, round, and reactive to light.      Visual Fields: Right eye visual fields normal and left eye visual fields normal.   Cardiovascular:      Rate and Rhythm: Normal rate and regular rhythm.      Heart sounds: Normal heart sounds.   Pulmonary:      Effort: Pulmonary effort is normal. No retractions.      Breath sounds: Normal breath sounds and air entry.   Abdominal:      General: Bowel sounds are normal. There is no distension.      Palpations: Abdomen is soft.      Tenderness: There is no abdominal tenderness.      Comments: No appreciable hepatosplenomegaly or ascites   Musculoskeletal:         General: Normal range of motion.      Cervical back: Normal range of motion and neck supple.   Skin:     General: Skin is warm and dry.   Neurological:      Mental Status: He is alert and oriented to person, place, and time.   Psychiatric:         Mood and Affect: Mood and affect normal.         Behavior: Behavior normal.         Result Review :   The following data was reviewed by: MARISOL Cantu on 01/20/2022:  CMP    CMP 8/31/21 9/28/21 10/26/21   Glucose 117 (A) 107 (A) 106 (A)   BUN 14 14 15   Creatinine 1.32 (A) 1.23 1.32 (A)   eGFR Non African Am 60 (A) 65 60 (A)   Sodium 141 141 139   Potassium 4.1 3.6 3.9   Chloride 103 105 104   Calcium 9.3 9.2 9.3   Albumin 4.20  4.30   Total Bilirubin 0.6  0.6   Alkaline Phosphatase 64  53   AST (SGOT) 23  25   ALT (SGPT) 54 (A)  46 (A)   (A) Abnormal value            CBC w/diff    CBC w/Diff 8/31/21 10/26/21   WBC 6.69 6.14   RBC 5.02 4.89   Hemoglobin 15.5 15.2   Hematocrit 45.2 44.1   MCV 90.0 90.2   MCH 30.9 31.1   MCHC 34.3 34.5   RDW 12.2 (A) 12.3   Platelets 199 177   Neutrophil Rel %  70.7   Immature Granulocyte Rel %  0.2   Lymphocyte Rel %  20.7   Monocyte Rel %  7.3   Eosinophil Rel %  0.8   Basophil Rel %  0.3   (A) Abnormal value            Lab  Results   Component Value Date    IRON 101 10/26/2021    TIBC 344 10/26/2021    FERRITIN 276.00 10/26/2021    LABIRON 29 10/26/2021              Assessment and Plan    Diagnoses and all orders for this visit:    1. Fatty liver (Primary)      * Surgery not found *     Educated patient that a 10% reduction in weight loss is significantly proven to decrease fatty liver.    Follow Up   Return in about 1 year (around 1/20/2023).  Patient was given instructions and counseling regarding his condition or for health maintenance advice. Please see specific information pulled into the AVS if appropriate.

## 2022-03-11 DIAGNOSIS — I10 ESSENTIAL HYPERTENSION: ICD-10-CM

## 2022-03-11 DIAGNOSIS — R00.0 TACHYCARDIA: ICD-10-CM

## 2022-03-11 RX ORDER — METOPROLOL SUCCINATE 25 MG/1
25 TABLET, EXTENDED RELEASE ORAL DAILY
Qty: 90 TABLET | Refills: 0 | Status: SHIPPED | OUTPATIENT
Start: 2022-03-11 | End: 2022-06-14 | Stop reason: SDUPTHER

## 2022-03-11 RX ORDER — LISINOPRIL 10 MG/1
10 TABLET ORAL DAILY
Qty: 90 TABLET | Refills: 0 | Status: SHIPPED | OUTPATIENT
Start: 2022-03-11 | End: 2022-06-14 | Stop reason: SDUPTHER

## 2022-06-07 PROBLEM — K76.0 NAFLD (NONALCOHOLIC FATTY LIVER DISEASE): Status: ACTIVE | Noted: 2022-06-07

## 2022-06-07 NOTE — PROGRESS NOTES
"Chief Complaint  Hypertension (Follow up/)    Subjective          Robin Patel presents to [facility]  The patient returns for a follow-up.    HTN: He is currently taking lisinopril 10 mg daily metoprolol 25 mg daily.  He also takes metoprolol for his heart rate.  BP controlled. Denies chest pain, blurred vision, H/A, and pedal edema.     NAFLD: He is undergone evaluation from gastro and was found to have a fatty liver.  He has been working on lifestyle modifications. He occasionally takes a Vitamin E supplement.       Objective   Vital Signs:   /68 (BP Location: Right arm, Patient Position: Sitting)   Pulse 76   Ht 188 cm (74\")   Wt 132 kg (291 lb)   BMI 37.36 kg/m²     Physical Exam  Constitutional:       General: He is not in acute distress.     Appearance: Normal appearance. He is obese.   HENT:      Head: Normocephalic.   Eyes:      Pupils: Pupils are equal, round, and reactive to light.      Visual Fields: Right eye visual fields normal and left eye visual fields normal.   Neck:      Trachea: Trachea normal.   Cardiovascular:      Rate and Rhythm: Normal rate and regular rhythm.      Heart sounds: Normal heart sounds.   Pulmonary:      Effort: Pulmonary effort is normal.      Breath sounds: Normal breath sounds and air entry.   Musculoskeletal:      Right lower leg: No edema.      Left lower leg: No edema.   Skin:     General: Skin is warm and dry.   Neurological:      Mental Status: He is alert and oriented to person, place, and time.   Psychiatric:         Mood and Affect: Mood and affect normal.         Behavior: Behavior normal.         Thought Content: Thought content normal.        Result Review :   The following data was reviewed by: MARISOL Fowler on 06/14/2022:  CBC & Differential (10/26/2021 10:28)  Comprehensive Metabolic Panel (10/26/2021 10:28)  JOHNSON Fibrosure (10/26/2021 10:28)                   Assessment and Plan    Diagnoses and all orders " for this visit:    1. Essential hypertension (Primary)  Assessment & Plan:  Hypertension is improving with treatment.  Continue current treatment regimen.  Dietary sodium restriction.  Weight loss.  Regular aerobic exercise.  Continue current medications.  Blood pressure will be reassessed at the next regular appointment.    Continue lisinopril 10 mg daily and metoprolol 25 mg daily.    Orders:  -     CBC (No Diff); Future  -     Comprehensive Metabolic Panel; Future  -     Lipid Panel; Future  -     lisinopril (PRINIVIL,ZESTRIL) 10 MG tablet; Take 1 tablet by mouth Daily  Dispense: 90 tablet; Refill: 1  -     metoprolol succinate XL (Toprol XL) 25 MG 24 hr tablet; Take 1 tablet by mouth Daily  Dispense: 90 tablet; Refill: 1    2. NAFLD (nonalcoholic fatty liver disease)  Assessment & Plan:  We will check CMP.    Orders:  -     CBC (No Diff); Future  -     Comprehensive Metabolic Panel; Future    3. Screening for cholesterol level  -     Lipid Panel; Future    4. Tachycardia  -     metoprolol succinate XL (Toprol XL) 25 MG 24 hr tablet; Take 1 tablet by mouth Daily  Dispense: 90 tablet; Refill: 1        Follow Up   Return in about 6 months (around 12/14/2022).  Patient was given instructions and counseling regarding his condition or for health maintenance advice. Please see specific information pulled into the AVS if appropriate.

## 2022-06-14 ENCOUNTER — OFFICE VISIT (OUTPATIENT)
Dept: FAMILY MEDICINE CLINIC | Age: 42
End: 2022-06-14

## 2022-06-14 VITALS
HEIGHT: 74 IN | BODY MASS INDEX: 37.35 KG/M2 | SYSTOLIC BLOOD PRESSURE: 111 MMHG | WEIGHT: 291 LBS | HEART RATE: 76 BPM | DIASTOLIC BLOOD PRESSURE: 68 MMHG

## 2022-06-14 DIAGNOSIS — Z13.220 SCREENING FOR CHOLESTEROL LEVEL: ICD-10-CM

## 2022-06-14 DIAGNOSIS — R00.0 TACHYCARDIA: ICD-10-CM

## 2022-06-14 DIAGNOSIS — I10 ESSENTIAL HYPERTENSION: Primary | ICD-10-CM

## 2022-06-14 DIAGNOSIS — K76.0 NAFLD (NONALCOHOLIC FATTY LIVER DISEASE): ICD-10-CM

## 2022-06-14 PROCEDURE — 99214 OFFICE O/P EST MOD 30 MIN: CPT | Performed by: NURSE PRACTITIONER

## 2022-06-14 RX ORDER — METOPROLOL SUCCINATE 25 MG/1
25 TABLET, EXTENDED RELEASE ORAL DAILY
Qty: 90 TABLET | Refills: 1 | Status: SHIPPED | OUTPATIENT
Start: 2022-06-14 | End: 2022-12-13 | Stop reason: SDUPTHER

## 2022-06-14 RX ORDER — LISINOPRIL 10 MG/1
10 TABLET ORAL DAILY
Qty: 90 TABLET | Refills: 1 | Status: SHIPPED | OUTPATIENT
Start: 2022-06-14 | End: 2022-12-13 | Stop reason: SDUPTHER

## 2022-06-14 NOTE — ASSESSMENT & PLAN NOTE
Hypertension is improving with treatment.  Continue current treatment regimen.  Dietary sodium restriction.  Weight loss.  Regular aerobic exercise.  Continue current medications.  Blood pressure will be reassessed at the next regular appointment.    Continue lisinopril 10 mg daily and metoprolol 25 mg daily.

## 2022-06-24 ENCOUNTER — TELEPHONE (OUTPATIENT)
Dept: FAMILY MEDICINE CLINIC | Age: 42
End: 2022-06-24

## 2022-06-28 ENCOUNTER — LAB (OUTPATIENT)
Dept: LAB | Facility: HOSPITAL | Age: 42
End: 2022-06-28

## 2022-06-28 DIAGNOSIS — K76.0 NAFLD (NONALCOHOLIC FATTY LIVER DISEASE): ICD-10-CM

## 2022-06-28 DIAGNOSIS — Z13.220 SCREENING FOR CHOLESTEROL LEVEL: ICD-10-CM

## 2022-06-28 DIAGNOSIS — I10 ESSENTIAL HYPERTENSION: ICD-10-CM

## 2022-06-28 LAB
DEPRECATED RDW RBC AUTO: 40.2 FL (ref 37–54)
ERYTHROCYTE [DISTWIDTH] IN BLOOD BY AUTOMATED COUNT: 12.4 % (ref 12.3–15.4)
HCT VFR BLD AUTO: 43.6 % (ref 37.5–51)
HGB BLD-MCNC: 14.8 G/DL (ref 13–17.7)
MCH RBC QN AUTO: 29.8 PG (ref 26.6–33)
MCHC RBC AUTO-ENTMCNC: 33.9 G/DL (ref 31.5–35.7)
MCV RBC AUTO: 87.9 FL (ref 79–97)
PLATELET # BLD AUTO: 250 10*3/MM3 (ref 140–450)
PMV BLD AUTO: 9.3 FL (ref 6–12)
RBC # BLD AUTO: 4.96 10*6/MM3 (ref 4.14–5.8)
WBC NRBC COR # BLD: 8.5 10*3/MM3 (ref 3.4–10.8)

## 2022-06-28 PROCEDURE — 80053 COMPREHEN METABOLIC PANEL: CPT

## 2022-06-28 PROCEDURE — 36415 COLL VENOUS BLD VENIPUNCTURE: CPT

## 2022-06-28 PROCEDURE — 80061 LIPID PANEL: CPT

## 2022-06-28 PROCEDURE — 85027 COMPLETE CBC AUTOMATED: CPT

## 2022-06-29 LAB
ALBUMIN SERPL-MCNC: 4.4 G/DL (ref 3.5–5.2)
ALBUMIN/GLOB SERPL: 1.8 G/DL
ALP SERPL-CCNC: 59 U/L (ref 39–117)
ALT SERPL W P-5'-P-CCNC: 55 U/L (ref 1–41)
ANION GAP SERPL CALCULATED.3IONS-SCNC: 10.7 MMOL/L (ref 5–15)
AST SERPL-CCNC: 19 U/L (ref 1–40)
BILIRUB SERPL-MCNC: 0.6 MG/DL (ref 0–1.2)
BUN SERPL-MCNC: 16 MG/DL (ref 6–20)
BUN/CREAT SERPL: 14.7 (ref 7–25)
CALCIUM SPEC-SCNC: 10 MG/DL (ref 8.6–10.5)
CHLORIDE SERPL-SCNC: 102 MMOL/L (ref 98–107)
CHOLEST SERPL-MCNC: 151 MG/DL (ref 0–200)
CO2 SERPL-SCNC: 24.3 MMOL/L (ref 22–29)
CREAT SERPL-MCNC: 1.09 MG/DL (ref 0.76–1.27)
EGFRCR SERPLBLD CKD-EPI 2021: 88 ML/MIN/1.73
GLOBULIN UR ELPH-MCNC: 2.5 GM/DL
GLUCOSE SERPL-MCNC: 94 MG/DL (ref 65–99)
HDLC SERPL-MCNC: 33 MG/DL (ref 40–60)
LDLC SERPL CALC-MCNC: 103 MG/DL (ref 0–100)
LDLC/HDLC SERPL: 3.11 {RATIO}
POTASSIUM SERPL-SCNC: 4 MMOL/L (ref 3.5–5.2)
PROT SERPL-MCNC: 6.9 G/DL (ref 6–8.5)
SODIUM SERPL-SCNC: 137 MMOL/L (ref 136–145)
TRIGL SERPL-MCNC: 77 MG/DL (ref 0–150)
VLDLC SERPL-MCNC: 15 MG/DL (ref 5–40)

## 2022-11-23 ENCOUNTER — OFFICE VISIT (OUTPATIENT)
Dept: FAMILY MEDICINE CLINIC | Age: 42
End: 2022-11-23

## 2022-11-23 VITALS
HEART RATE: 86 BPM | WEIGHT: 291 LBS | HEIGHT: 74 IN | SYSTOLIC BLOOD PRESSURE: 134 MMHG | DIASTOLIC BLOOD PRESSURE: 92 MMHG | BODY MASS INDEX: 37.35 KG/M2 | TEMPERATURE: 98.3 F | OXYGEN SATURATION: 97 %

## 2022-11-23 DIAGNOSIS — J02.9 SORE THROAT: ICD-10-CM

## 2022-11-23 DIAGNOSIS — I10 ESSENTIAL HYPERTENSION: ICD-10-CM

## 2022-11-23 DIAGNOSIS — R09.81 NASAL CONGESTION: ICD-10-CM

## 2022-11-23 DIAGNOSIS — R05.1 ACUTE COUGH: ICD-10-CM

## 2022-11-23 DIAGNOSIS — J11.1 INFLUENZA: Primary | ICD-10-CM

## 2022-11-23 LAB
EXPIRATION DATE: ABNORMAL
EXPIRATION DATE: NORMAL
FLUAV AG NPH QL: NEGATIVE
FLUBV AG NPH QL: POSITIVE
INTERNAL CONTROL: ABNORMAL
INTERNAL CONTROL: NORMAL
Lab: ABNORMAL
Lab: NORMAL
S PYO AG THROAT QL: NEGATIVE

## 2022-11-23 PROCEDURE — 87880 STREP A ASSAY W/OPTIC: CPT

## 2022-11-23 PROCEDURE — 87081 CULTURE SCREEN ONLY: CPT

## 2022-11-23 PROCEDURE — 87804 INFLUENZA ASSAY W/OPTIC: CPT

## 2022-11-23 PROCEDURE — 99213 OFFICE O/P EST LOW 20 MIN: CPT

## 2022-11-23 RX ORDER — OSELTAMIVIR PHOSPHATE 75 MG/1
75 CAPSULE ORAL 2 TIMES DAILY
Qty: 10 CAPSULE | Refills: 0 | Status: SHIPPED | OUTPATIENT
Start: 2022-11-23 | End: 2022-11-28

## 2022-11-23 NOTE — PROGRESS NOTES
"Subjective     CHIEF COMPLAINT    Chief Complaint   Patient presents with   • Nasal Congestion     X 1 day, sore throat. At home negative covid test      History of Present Illness  Patient is a 42-year-old male who presents to the clinic with complaints of congestion, drainage and sore throat.  Symptoms began yesterday.  Denies any known exposures to any illnesses.  Also endorses a mild cough.  Taking Xyzal for allergies.  No history of chronic lung issues.  Took Sudafed and Advil .Declines covid swab today, reports he had negative covid test at home. Denies any fever, chills, shortness of breath, wheezing and chest pain.       Review of Systems   Constitutional: Negative for chills and fever.   HENT: Positive for congestion and sore throat. Negative for ear pain.    Respiratory: Positive for cough (mild today). Negative for shortness of breath and wheezing.    Cardiovascular: Negative for chest pain.   Gastrointestinal: Negative for diarrhea, nausea and vomiting.   Musculoskeletal: Negative for myalgias.   Neurological: Positive for headaches.     No Known Allergies    Current Outpatient Medications on File Prior to Visit   Medication Sig Dispense Refill   • levocetirizine (XYZAL) 5 MG tablet Take 5 mg by mouth Every Evening.     • lisinopril (PRINIVIL,ZESTRIL) 10 MG tablet Take 1 tablet by mouth Daily 90 tablet 1   • metoprolol succinate XL (Toprol XL) 25 MG 24 hr tablet Take 1 tablet by mouth Daily 90 tablet 1     No current facility-administered medications on file prior to visit.     /92 (BP Location: Right arm, Patient Position: Sitting)   Pulse 86   Temp 98.3 °F (36.8 °C) (Oral)   Ht 188 cm (74.02\")   Wt 132 kg (291 lb)   SpO2 97%   BMI 37.35 kg/m²     Objective     Physical Exam  Vitals and nursing note reviewed.   Constitutional:       General: He is not in acute distress.     Appearance: Normal appearance. He is not ill-appearing.   HENT:      Head: Normocephalic and atraumatic.      Right " Ear: Hearing, tympanic membrane, ear canal and external ear normal.      Left Ear: Hearing, tympanic membrane, ear canal and external ear normal.      Mouth/Throat:      Mouth: Mucous membranes are moist.      Pharynx: Oropharynx is clear. Uvula midline. No pharyngeal swelling, oropharyngeal exudate, posterior oropharyngeal erythema or uvula swelling.   Eyes:      Extraocular Movements: Extraocular movements intact.   Cardiovascular:      Rate and Rhythm: Normal rate and regular rhythm.      Heart sounds: Normal heart sounds. No murmur heard.  Pulmonary:      Effort: Pulmonary effort is normal. No accessory muscle usage or respiratory distress.      Breath sounds: Normal breath sounds. No wheezing or rhonchi.   Musculoskeletal:      Cervical back: Normal range of motion.   Lymphadenopathy:      Cervical: No cervical adenopathy.   Skin:     General: Skin is warm and dry.   Neurological:      General: No focal deficit present.      Mental Status: He is alert and oriented to person, place, and time.   Psychiatric:         Mood and Affect: Mood and affect normal.         Behavior: Behavior normal.            Lab Results (last 24 hours)     Procedure Component Value Units Date/Time    POCT rapid strep A [[id number]] Collected: 11/23/22 1548    Specimen: Swab Updated: 11/23/22 1549     Rapid Strep A Screen Negative     Internal Control Passed     Lot Number [id number]     Expiration Date 12/31/23    Beta Strep Culture, Throat - Swab, Throat [[id number]] Collected: 11/23/22 1549    Specimen: Swab from Throat Updated: 11/23/22 1551    POCT Influenza A/B [[id number]]  (Abnormal) Collected: 11/23/22 1558    Specimen: Swab Updated: 11/23/22 1558     Rapid Influenza A Ag Negative     Rapid Influenza B Ag Positive     Internal Control Passed     Lot Number [id number]     Expiration Date 8/9/24           Diagnoses and all orders for this visit:    1. Influenza (Primary)  Comments:  Requests tamiflu. Increase fluids, rest.  Tylenol/Ibuprofen for fever/aches.   Orders:  -     oseltamivir (Tamiflu) 75 MG capsule; Take 1 capsule by mouth 2 (Two) Times a Day for 5 days.  Dispense: 10 capsule; Refill: 0    2. Sore throat  Comments:  Negative rapid strep, strep culture sent and pending. Will treat accordingly.   Orders:  -     POCT rapid strep A  -     POCT Influenza A/B  -     Beta Strep Culture, Throat - , Throat; Future  -     Beta Strep Culture, Throat - Swab, Throat    3. Acute cough  Comments:  Recommend mucinex.     4. Nasal congestion  Comments:  Continue xyzal, recommend flonase OTC.     5. Essential hypertension  Comments:  Pt reports taking sudafed, educated to avoid sudafed due to HTN      For any shortness of breath, chest pain or worsening symptoms patient was instructed to proceed to ER. Patient voiced understanding of all instructions and had no further questions at this time.     Follow up:   Return if symptoms worsen or fail to improve.  Patient was given instructions and counseling regarding his condition or for health maintenance advice. Please see specific information pulled into the AVS if appropriate.

## 2022-11-25 LAB — BACTERIA SPEC AEROBE CULT: NORMAL

## 2022-12-06 NOTE — PROGRESS NOTES
"Chief Complaint  Hypertension    Subjective          Robin Patel presents to [facility]  History of Present Illness  HTN: He is taking lisinopril 10 mg daily and metoprolol 25 mg daily, for which he also takes for his heart rate.  He doesn't check his BP at home. Denies chest pain, blurred vision, H/A, and pedal edema.     NAFLD: His ALT was 55 back in June. He hasn't taken Vit E for a while.       Objective   Vital Signs:   /85 (BP Location: Left arm, Patient Position: Sitting)   Pulse 92   Temp 98.6 °F (37 °C) (Oral)   Ht 188 cm (74\")   Wt 132 kg (290 lb 3.2 oz)   BMI 37.26 kg/m²     Physical Exam  Constitutional:       General: He is not in acute distress.     Appearance: Normal appearance. He is obese.   HENT:      Head: Normocephalic.   Eyes:      Pupils: Pupils are equal, round, and reactive to light.      Visual Fields: Right eye visual fields normal and left eye visual fields normal.   Neck:      Trachea: Trachea normal.   Cardiovascular:      Rate and Rhythm: Normal rate and regular rhythm.      Heart sounds: Normal heart sounds.   Pulmonary:      Effort: Pulmonary effort is normal.      Breath sounds: Normal breath sounds and air entry.   Musculoskeletal:      Right lower leg: No edema.      Left lower leg: No edema.   Skin:     General: Skin is warm and dry.   Neurological:      Mental Status: He is alert and oriented to person, place, and time.   Psychiatric:         Mood and Affect: Mood and affect normal.         Behavior: Behavior normal.         Thought Content: Thought content normal.        Result Review :   The following data was reviewed by: MARISOL Fowler on 12/13/2022:  Lipid Panel (06/28/2022 12:21)  CBC (No Diff) (06/28/2022 12:21)  Comprehensive Metabolic Panel (06/28/2022 12:21)                 Assessment and Plan    Diagnoses and all orders for this visit:    1. Essential hypertension (Primary)  Assessment & Plan:  Hypertension is " improving with treatment.  Continue current treatment regimen.  Dietary sodium restriction.  Weight loss.  Regular aerobic exercise.  Continue current medications.  Blood pressure will be reassessed at the next regular appointment   Continue lisinopril 10 mg daily and metoprolol 25 mg daily.    Orders:  -     CBC (No Diff); Future  -     Comprehensive Metabolic Panel; Future  -     Lipid Panel; Future  -     lisinopril (PRINIVIL,ZESTRIL) 10 MG tablet; Take 1 tablet by mouth Daily  Dispense: 90 tablet; Refill: 1  -     metoprolol succinate XL (Toprol XL) 25 MG 24 hr tablet; Take 1 tablet by mouth Daily  Dispense: 90 tablet; Refill: 1    2. Tachycardia  Assessment & Plan:  Continue metoprolol 25 mg daily.    Orders:  -     metoprolol succinate XL (Toprol XL) 25 MG 24 hr tablet; Take 1 tablet by mouth Daily  Dispense: 90 tablet; Refill: 1    3. Class 2 obesity due to excess calories with body mass index (BMI) of 37.0 to 37.9 in adult, unspecified whether serious comorbidity present  -     CBC (No Diff); Future  -     Comprehensive Metabolic Panel; Future  -     Lipid Panel; Future  -     Hemoglobin A1c; Future    4. NAFLD (nonalcoholic fatty liver disease)  Assessment & Plan:  We will check a CMP.    Orders:  -     CBC (No Diff); Future  -     Comprehensive Metabolic Panel; Future  -     Lipid Panel; Future  -     Hemoglobin A1c; Future    5. Screening for diabetes mellitus (DM)  -     Hemoglobin A1c; Future        Follow Up   Return in about 6 months (around 6/13/2023) for Annual physical.  Patient was given instructions and counseling regarding his condition or for health maintenance advice. Please see specific information pulled into the AVS if appropriate.

## 2022-12-13 ENCOUNTER — OFFICE VISIT (OUTPATIENT)
Dept: FAMILY MEDICINE CLINIC | Age: 42
End: 2022-12-13

## 2022-12-13 VITALS
HEART RATE: 92 BPM | HEIGHT: 74 IN | BODY MASS INDEX: 37.24 KG/M2 | WEIGHT: 290.2 LBS | DIASTOLIC BLOOD PRESSURE: 85 MMHG | TEMPERATURE: 98.6 F | SYSTOLIC BLOOD PRESSURE: 131 MMHG

## 2022-12-13 DIAGNOSIS — Z13.1 SCREENING FOR DIABETES MELLITUS (DM): ICD-10-CM

## 2022-12-13 DIAGNOSIS — K76.0 NAFLD (NONALCOHOLIC FATTY LIVER DISEASE): ICD-10-CM

## 2022-12-13 DIAGNOSIS — I10 ESSENTIAL HYPERTENSION: Primary | ICD-10-CM

## 2022-12-13 DIAGNOSIS — E66.09 CLASS 2 OBESITY DUE TO EXCESS CALORIES WITH BODY MASS INDEX (BMI) OF 37.0 TO 37.9 IN ADULT, UNSPECIFIED WHETHER SERIOUS COMORBIDITY PRESENT: ICD-10-CM

## 2022-12-13 DIAGNOSIS — R00.0 TACHYCARDIA: ICD-10-CM

## 2022-12-13 PROCEDURE — 99213 OFFICE O/P EST LOW 20 MIN: CPT | Performed by: NURSE PRACTITIONER

## 2022-12-13 RX ORDER — METOPROLOL SUCCINATE 25 MG/1
25 TABLET, EXTENDED RELEASE ORAL DAILY
Qty: 90 TABLET | Refills: 1 | Status: SHIPPED | OUTPATIENT
Start: 2022-12-13 | End: 2023-06-19

## 2022-12-13 RX ORDER — LISINOPRIL 10 MG/1
10 TABLET ORAL DAILY
Qty: 90 TABLET | Refills: 1 | Status: SHIPPED | OUTPATIENT
Start: 2022-12-13 | End: 2023-06-19

## 2022-12-13 NOTE — ASSESSMENT & PLAN NOTE
Hypertension is improving with treatment.  Continue current treatment regimen.  Dietary sodium restriction.  Weight loss.  Regular aerobic exercise.  Continue current medications.  Blood pressure will be reassessed at the next regular appointment   Continue lisinopril 10 mg daily and metoprolol 25 mg daily.

## 2022-12-27 ENCOUNTER — TELEPHONE (OUTPATIENT)
Dept: FAMILY MEDICINE CLINIC | Age: 42
End: 2022-12-27

## 2022-12-27 NOTE — TELEPHONE ENCOUNTER
----- Message from Felipa Basilio LPN sent at 12/27/2022  8:12 AM EST -----      ----- Message -----  From: SYSTEM  Sent: 12/23/2022   1:08 AM EST  To: [facility]

## 2023-01-10 ENCOUNTER — LAB (OUTPATIENT)
Dept: LAB | Facility: HOSPITAL | Age: 43
End: 2023-01-10
Payer: COMMERCIAL

## 2023-01-10 DIAGNOSIS — Z13.1 SCREENING FOR DIABETES MELLITUS (DM): ICD-10-CM

## 2023-01-10 DIAGNOSIS — I10 ESSENTIAL HYPERTENSION: ICD-10-CM

## 2023-01-10 DIAGNOSIS — K76.0 NAFLD (NONALCOHOLIC FATTY LIVER DISEASE): ICD-10-CM

## 2023-01-10 DIAGNOSIS — E66.09 CLASS 2 OBESITY DUE TO EXCESS CALORIES WITH BODY MASS INDEX (BMI) OF 37.0 TO 37.9 IN ADULT, UNSPECIFIED WHETHER SERIOUS COMORBIDITY PRESENT: ICD-10-CM

## 2023-01-10 LAB
ALBUMIN SERPL-MCNC: 4.3 G/DL (ref 3.5–5.2)
ALBUMIN/GLOB SERPL: 1.7 G/DL
ALP SERPL-CCNC: 50 U/L (ref 39–117)
ALT SERPL W P-5'-P-CCNC: 32 U/L (ref 1–41)
ANION GAP SERPL CALCULATED.3IONS-SCNC: 7.2 MMOL/L (ref 5–15)
AST SERPL-CCNC: 21 U/L (ref 1–40)
BILIRUB SERPL-MCNC: 0.5 MG/DL (ref 0–1.2)
BUN SERPL-MCNC: 15 MG/DL (ref 6–20)
BUN/CREAT SERPL: 12.8 (ref 7–25)
CALCIUM SPEC-SCNC: 9.2 MG/DL (ref 8.6–10.5)
CHLORIDE SERPL-SCNC: 103 MMOL/L (ref 98–107)
CHOLEST SERPL-MCNC: 131 MG/DL (ref 0–200)
CO2 SERPL-SCNC: 28.8 MMOL/L (ref 22–29)
CREAT SERPL-MCNC: 1.17 MG/DL (ref 0.76–1.27)
DEPRECATED RDW RBC AUTO: 40 FL (ref 37–54)
EGFRCR SERPLBLD CKD-EPI 2021: 80.3 ML/MIN/1.73
ERYTHROCYTE [DISTWIDTH] IN BLOOD BY AUTOMATED COUNT: 12.2 % (ref 12.3–15.4)
GLOBULIN UR ELPH-MCNC: 2.6 GM/DL
GLUCOSE SERPL-MCNC: 104 MG/DL (ref 65–99)
HBA1C MFR BLD: 5.5 % (ref 4.8–5.6)
HCT VFR BLD AUTO: 45.3 % (ref 37.5–51)
HDLC SERPL-MCNC: 39 MG/DL (ref 40–60)
HGB BLD-MCNC: 15.2 G/DL (ref 13–17.7)
LDLC SERPL CALC-MCNC: 78 MG/DL (ref 0–100)
LDLC/HDLC SERPL: 2.02 {RATIO}
MCH RBC QN AUTO: 30.1 PG (ref 26.6–33)
MCHC RBC AUTO-ENTMCNC: 33.6 G/DL (ref 31.5–35.7)
MCV RBC AUTO: 89.7 FL (ref 79–97)
PLATELET # BLD AUTO: 177 10*3/MM3 (ref 140–450)
PMV BLD AUTO: 10.2 FL (ref 6–12)
POTASSIUM SERPL-SCNC: 3.8 MMOL/L (ref 3.5–5.2)
PROT SERPL-MCNC: 6.9 G/DL (ref 6–8.5)
RBC # BLD AUTO: 5.05 10*6/MM3 (ref 4.14–5.8)
SODIUM SERPL-SCNC: 139 MMOL/L (ref 136–145)
TRIGL SERPL-MCNC: 66 MG/DL (ref 0–150)
VLDLC SERPL-MCNC: 14 MG/DL (ref 5–40)
WBC NRBC COR # BLD: 7.26 10*3/MM3 (ref 3.4–10.8)

## 2023-01-10 PROCEDURE — 80053 COMPREHEN METABOLIC PANEL: CPT

## 2023-01-10 PROCEDURE — 80061 LIPID PANEL: CPT

## 2023-01-10 PROCEDURE — 85027 COMPLETE CBC AUTOMATED: CPT

## 2023-01-10 PROCEDURE — 36415 COLL VENOUS BLD VENIPUNCTURE: CPT

## 2023-01-10 PROCEDURE — 83036 HEMOGLOBIN GLYCOSYLATED A1C: CPT

## 2023-02-21 ENCOUNTER — TELEPHONE (OUTPATIENT)
Dept: GASTROENTEROLOGY | Facility: CLINIC | Age: 43
End: 2023-02-21
Payer: COMMERCIAL

## 2023-02-21 NOTE — TELEPHONE ENCOUNTER
Called patient and attempted to offer patient to schedule an appointment with Nelda at our [place] location. Left a voicemail requesting a call back.

## 2023-02-24 NOTE — TELEPHONE ENCOUNTER
Attempted to call Robin Patel, 1980, for the second time to schedule an appointment with Nelda Gerardo. Robin Patel did not answer, left a message requesting a call back.

## 2023-02-28 NOTE — TELEPHONE ENCOUNTER
Letter has been sent to patient since we've called the patient two times and left vm requesting a call back.

## 2023-06-06 NOTE — PROGRESS NOTES
"Chief Complaint  Hypertension (6 month)    Subjective          Robin Patel presents to [facility]  History of Present Illness  HTN: He is taking lisinopril 10 mg daily and metoprolol 25 mg daily. His BP is a little elevated in office today. Denies chest pain, H/A, blurred vision, and pedal edemas.     NAFLD: He has a history of elevated liver enzymes.  Whenever last checked, they were normal.    Objective   Vital Signs:   /86 (BP Location: Left arm, Patient Position: Sitting, Cuff Size: Large Adult)   Pulse 92   Temp 98.3 °F (36.8 °C) (Oral)   Ht 188 cm (74.02\")   Wt (!) 140 kg (307 lb 9.6 oz)   SpO2 96% Comment: room air  BMI 39.48 kg/m²     Physical Exam  Constitutional:       General: He is not in acute distress.     Appearance: Normal appearance. He is obese.   HENT:      Head: Normocephalic.   Eyes:      Pupils: Pupils are equal, round, and reactive to light.      Visual Fields: Right eye visual fields normal and left eye visual fields normal.   Neck:      Trachea: Trachea normal.   Cardiovascular:      Rate and Rhythm: Normal rate and regular rhythm.      Heart sounds: Normal heart sounds.   Pulmonary:      Effort: Pulmonary effort is normal.      Breath sounds: Normal breath sounds and air entry.   Musculoskeletal:      Right lower leg: No edema.      Left lower leg: No edema.   Skin:     General: Skin is warm and dry.   Neurological:      Mental Status: He is alert and oriented to person, place, and time.   Psychiatric:         Mood and Affect: Mood and affect normal.         Behavior: Behavior normal.         Thought Content: Thought content normal.      Result Review :   The following data was reviewed by: MARISOL Fowler on 06/13/2023:  Hemoglobin A1c (01/10/2023 11:23)  Lipid Panel (01/10/2023 11:23)  Comprehensive Metabolic Panel (01/10/2023 11:23)  CBC (No Diff) (01/10/2023 11:23)               Assessment and Plan    Diagnoses and all orders for " this visit:    1. Essential hypertension (Primary)  Assessment & Plan:  Hypertension is unchanged.  Continue current treatment regimen.  Dietary sodium restriction.  Weight loss.  Regular aerobic exercise.  Continue current medications.  Blood pressure will be reassessed at the next regular appointment.    Continue lisinopril 10 mg daily and metoprolol 25 mg daily.    Orders:  -     Comprehensive Metabolic Panel; Future    2. NAFLD (nonalcoholic fatty liver disease)  -     Comprehensive Metabolic Panel; Future          Follow Up   Return in about 6 months (around 12/13/2023) for Annual physical.  Patient was given instructions and counseling regarding his condition or for health maintenance advice. Please see specific information pulled into the AVS if appropriate.

## 2023-06-13 ENCOUNTER — OFFICE VISIT (OUTPATIENT)
Dept: FAMILY MEDICINE CLINIC | Age: 43
End: 2023-06-13
Payer: COMMERCIAL

## 2023-06-13 ENCOUNTER — LAB (OUTPATIENT)
Dept: LAB | Facility: HOSPITAL | Age: 43
End: 2023-06-13
Payer: COMMERCIAL

## 2023-06-13 VITALS
WEIGHT: 307.6 LBS | BODY MASS INDEX: 39.47 KG/M2 | HEART RATE: 92 BPM | SYSTOLIC BLOOD PRESSURE: 139 MMHG | DIASTOLIC BLOOD PRESSURE: 86 MMHG | HEIGHT: 74 IN | TEMPERATURE: 98.3 F | OXYGEN SATURATION: 96 %

## 2023-06-13 DIAGNOSIS — I10 ESSENTIAL HYPERTENSION: ICD-10-CM

## 2023-06-13 DIAGNOSIS — K76.0 NAFLD (NONALCOHOLIC FATTY LIVER DISEASE): ICD-10-CM

## 2023-06-13 DIAGNOSIS — I10 ESSENTIAL HYPERTENSION: Primary | ICD-10-CM

## 2023-06-13 LAB
ALBUMIN SERPL-MCNC: 4.4 G/DL (ref 3.5–5.2)
ALBUMIN/GLOB SERPL: 1.9 G/DL
ALP SERPL-CCNC: 51 U/L (ref 39–117)
ALT SERPL W P-5'-P-CCNC: 38 U/L (ref 1–41)
ANION GAP SERPL CALCULATED.3IONS-SCNC: 13.3 MMOL/L (ref 5–15)
AST SERPL-CCNC: 19 U/L (ref 1–40)
BILIRUB SERPL-MCNC: 0.6 MG/DL (ref 0–1.2)
BUN SERPL-MCNC: 14 MG/DL (ref 6–20)
BUN/CREAT SERPL: 11.1 (ref 7–25)
CALCIUM SPEC-SCNC: 9.5 MG/DL (ref 8.6–10.5)
CHLORIDE SERPL-SCNC: 104 MMOL/L (ref 98–107)
CO2 SERPL-SCNC: 24.7 MMOL/L (ref 22–29)
CREAT SERPL-MCNC: 1.26 MG/DL (ref 0.76–1.27)
EGFRCR SERPLBLD CKD-EPI 2021: 73.5 ML/MIN/1.73
GLOBULIN UR ELPH-MCNC: 2.3 GM/DL
GLUCOSE SERPL-MCNC: 107 MG/DL (ref 65–99)
POTASSIUM SERPL-SCNC: 3.9 MMOL/L (ref 3.5–5.2)
PROT SERPL-MCNC: 6.7 G/DL (ref 6–8.5)
SODIUM SERPL-SCNC: 142 MMOL/L (ref 136–145)

## 2023-06-13 PROCEDURE — 36415 COLL VENOUS BLD VENIPUNCTURE: CPT

## 2023-06-13 PROCEDURE — 99213 OFFICE O/P EST LOW 20 MIN: CPT | Performed by: NURSE PRACTITIONER

## 2023-06-13 PROCEDURE — 80053 COMPREHEN METABOLIC PANEL: CPT

## 2023-06-13 NOTE — ASSESSMENT & PLAN NOTE
Hypertension is unchanged.  Continue current treatment regimen.  Dietary sodium restriction.  Weight loss.  Regular aerobic exercise.  Continue current medications.  Blood pressure will be reassessed at the next regular appointment.    Continue lisinopril 10 mg daily and metoprolol 25 mg daily.

## 2023-06-19 DIAGNOSIS — R00.0 TACHYCARDIA: ICD-10-CM

## 2023-06-19 DIAGNOSIS — I10 ESSENTIAL HYPERTENSION: ICD-10-CM

## 2023-06-19 RX ORDER — METOPROLOL SUCCINATE 25 MG/1
25 TABLET, EXTENDED RELEASE ORAL DAILY
Qty: 90 TABLET | Refills: 1 | Status: SHIPPED | OUTPATIENT
Start: 2023-06-19

## 2023-06-19 RX ORDER — LISINOPRIL 10 MG/1
10 TABLET ORAL DAILY
Qty: 90 TABLET | Refills: 1 | Status: SHIPPED | OUTPATIENT
Start: 2023-06-19

## 2023-11-29 NOTE — PROGRESS NOTES
"Chief Complaint  Annual Exam    Subjective          Robin Patel presents to [facility]  History of Present Illness  He is here for physical.    His tetanus is not up-to-date.  He has had 4 doses of the COVID-vaccine.  He has had his flu vaccine this year. He does go to the dentist. He does wear his seat belt. He does do testicular self exams monthly. He doesn't eat healthy. He isn't physically active like he used to be. He doesn't drink as much water in the winter as he does in the summer.    HTN: He is taking lisinopril 10 mg daily and metoprolol 25 mg daily. BP elevated today in office.     NAFLD: He has a history of elevated liver enzymes.  They were last checked in June, they were normal.      Objective   Vital Signs:   /91 (BP Location: Left arm, Patient Position: Sitting)   Pulse 106   Ht 188 cm (74\")   Wt (!) 140 kg (307 lb 12.8 oz)   BMI 39.52 kg/m²     Physical Exam  Constitutional:       General: He is not in acute distress.     Appearance: Normal appearance. He is well-developed.   HENT:      Head: Normocephalic.      Right Ear: Tympanic membrane, ear canal and external ear normal.      Left Ear: Tympanic membrane, ear canal and external ear normal.      Mouth/Throat:      Mouth: Mucous membranes are moist.      Pharynx: Oropharynx is clear. No oropharyngeal exudate or posterior oropharyngeal erythema.   Eyes:      Extraocular Movements: Extraocular movements intact.      Conjunctiva/sclera: Conjunctivae normal.      Pupils: Pupils are equal, round, and reactive to light.      Visual Fields: Right eye visual fields normal and left eye visual fields normal.   Neck:      Thyroid: No thyromegaly or thyroid tenderness.   Cardiovascular:      Rate and Rhythm: Normal rate and regular rhythm.      Heart sounds: Normal heart sounds.   Pulmonary:      Effort: Pulmonary effort is normal. No retractions.      Breath sounds: Normal breath sounds and air entry. "   Abdominal:      General: Abdomen is flat. Bowel sounds are normal. There is no distension.      Palpations: Abdomen is soft. There is no mass.      Tenderness: There is no abdominal tenderness.   Musculoskeletal:         General: Normal range of motion.      Cervical back: Normal range of motion and neck supple.      Right lower leg: No edema.      Left lower leg: No edema.   Lymphadenopathy:      Cervical: No cervical adenopathy.   Skin:     General: Skin is warm and dry.   Neurological:      Mental Status: He is alert and oriented to person, place, and time.      Motor: No weakness.      Gait: Gait normal.   Psychiatric:         Mood and Affect: Mood and affect normal.         Behavior: Behavior normal.         Thought Content: Thought content normal.        Result Review :   The following data was reviewed by: MARISOL Fowler on 12/12/2023:                  Assessment and Plan    Diagnoses and all orders for this visit:    1. Annual physical exam (Primary)  -     CBC (No Diff); Future  -     Comprehensive Metabolic Panel; Future  -     Lipid Panel; Future  -     Hemoglobin A1c; Future  -     TSH; Future    2. Preventative health care  -     CBC (No Diff); Future  -     Comprehensive Metabolic Panel; Future  -     Lipid Panel; Future  -     Hemoglobin A1c; Future  -     TSH; Future    3. Screening for diabetes mellitus (DM)  -     Hemoglobin A1c; Future    4. Screening for cholesterol level  -     Lipid Panel; Future    5. Screening for thyroid disorder  -     TSH; Future    6. Essential hypertension  Assessment & Plan:  Hypertension is unchanged.  Continue current treatment regimen.  Dietary sodium restriction.  Weight loss.  Regular aerobic exercise.  Continue current medications.  Blood pressure will be reassessed at the next regular appointment.    Continue lisinopril 10 mg daily metoprolol 25 mg daily.      7. Tachycardia  Assessment & Plan:  Continue metoprolol 25 mg daily.      8. Obesity (BMI  30-39.9)  Assessment & Plan:  Patient's (Body mass index is 39.52 kg/m².) indicates that they are obese (BMI >30) with health conditions that include hypertension . Weight is unchanged. BMI  is above average; BMI management plan is completed. We discussed portion control and increasing exercise.       Preventative measures discussed.      Follow Up   Return in about 6 months (around 6/12/2024).  Patient was given instructions and counseling regarding his condition or for health maintenance advice. Please see specific information pulled into the AVS if appropriate.

## 2023-12-12 ENCOUNTER — OFFICE VISIT (OUTPATIENT)
Dept: FAMILY MEDICINE CLINIC | Age: 43
End: 2023-12-12
Payer: COMMERCIAL

## 2023-12-12 VITALS
WEIGHT: 307.8 LBS | HEIGHT: 74 IN | HEART RATE: 106 BPM | DIASTOLIC BLOOD PRESSURE: 91 MMHG | BODY MASS INDEX: 39.5 KG/M2 | SYSTOLIC BLOOD PRESSURE: 143 MMHG

## 2023-12-12 DIAGNOSIS — I10 ESSENTIAL HYPERTENSION: ICD-10-CM

## 2023-12-12 DIAGNOSIS — Z13.220 SCREENING FOR CHOLESTEROL LEVEL: ICD-10-CM

## 2023-12-12 DIAGNOSIS — Z00.00 ANNUAL PHYSICAL EXAM: Primary | ICD-10-CM

## 2023-12-12 DIAGNOSIS — Z00.00 PREVENTATIVE HEALTH CARE: ICD-10-CM

## 2023-12-12 DIAGNOSIS — R00.0 TACHYCARDIA: ICD-10-CM

## 2023-12-12 DIAGNOSIS — Z13.1 SCREENING FOR DIABETES MELLITUS (DM): ICD-10-CM

## 2023-12-12 DIAGNOSIS — E66.9 OBESITY (BMI 30-39.9): ICD-10-CM

## 2023-12-12 DIAGNOSIS — Z13.29 SCREENING FOR THYROID DISORDER: ICD-10-CM

## 2023-12-12 PROBLEM — E66.09 CLASS 2 OBESITY DUE TO EXCESS CALORIES WITH BODY MASS INDEX (BMI) OF 37.0 TO 37.9 IN ADULT: Status: RESOLVED | Noted: 2021-08-11 | Resolved: 2023-12-12

## 2023-12-12 PROBLEM — E66.812 CLASS 2 OBESITY DUE TO EXCESS CALORIES WITH BODY MASS INDEX (BMI) OF 37.0 TO 37.9 IN ADULT: Status: RESOLVED | Noted: 2021-08-11 | Resolved: 2023-12-12

## 2023-12-12 PROCEDURE — 99396 PREV VISIT EST AGE 40-64: CPT | Performed by: NURSE PRACTITIONER

## 2023-12-12 NOTE — ASSESSMENT & PLAN NOTE
Patient's (Body mass index is 39.52 kg/m².) indicates that they are obese (BMI >30) with health conditions that include hypertension . Weight is unchanged. BMI  is above average; BMI management plan is completed. We discussed portion control and increasing exercise.

## 2023-12-12 NOTE — ASSESSMENT & PLAN NOTE
Hypertension is unchanged.  Continue current treatment regimen.  Dietary sodium restriction.  Weight loss.  Regular aerobic exercise.  Continue current medications.  Blood pressure will be reassessed at the next regular appointment.    Continue lisinopril 10 mg daily metoprolol 25 mg daily.

## 2023-12-18 DIAGNOSIS — I10 ESSENTIAL HYPERTENSION: ICD-10-CM

## 2023-12-18 DIAGNOSIS — R00.0 TACHYCARDIA: ICD-10-CM

## 2023-12-18 RX ORDER — METOPROLOL SUCCINATE 25 MG/1
25 TABLET, EXTENDED RELEASE ORAL DAILY
Qty: 90 TABLET | Refills: 1 | Status: SHIPPED | OUTPATIENT
Start: 2023-12-18

## 2023-12-18 RX ORDER — LISINOPRIL 10 MG/1
10 TABLET ORAL DAILY
Qty: 90 TABLET | Refills: 1 | Status: SHIPPED | OUTPATIENT
Start: 2023-12-18

## 2023-12-19 ENCOUNTER — LAB (OUTPATIENT)
Dept: LAB | Facility: HOSPITAL | Age: 43
End: 2023-12-19
Payer: COMMERCIAL

## 2023-12-19 DIAGNOSIS — Z13.29 SCREENING FOR THYROID DISORDER: ICD-10-CM

## 2023-12-19 DIAGNOSIS — Z00.00 ANNUAL PHYSICAL EXAM: ICD-10-CM

## 2023-12-19 DIAGNOSIS — Z13.1 SCREENING FOR DIABETES MELLITUS (DM): ICD-10-CM

## 2023-12-19 DIAGNOSIS — Z00.00 PREVENTATIVE HEALTH CARE: ICD-10-CM

## 2023-12-19 DIAGNOSIS — Z13.220 SCREENING FOR CHOLESTEROL LEVEL: ICD-10-CM

## 2023-12-19 LAB
ALBUMIN SERPL-MCNC: 4.2 G/DL (ref 3.5–5.2)
ALBUMIN/GLOB SERPL: 1.8 G/DL
ALP SERPL-CCNC: 49 U/L (ref 39–117)
ALT SERPL W P-5'-P-CCNC: 43 U/L (ref 1–41)
ANION GAP SERPL CALCULATED.3IONS-SCNC: 10 MMOL/L (ref 5–15)
AST SERPL-CCNC: 21 U/L (ref 1–40)
BILIRUB SERPL-MCNC: 0.5 MG/DL (ref 0–1.2)
BUN SERPL-MCNC: 16 MG/DL (ref 6–20)
BUN/CREAT SERPL: 13.2 (ref 7–25)
CALCIUM SPEC-SCNC: 9.1 MG/DL (ref 8.6–10.5)
CHLORIDE SERPL-SCNC: 104 MMOL/L (ref 98–107)
CHOLEST SERPL-MCNC: 132 MG/DL (ref 0–200)
CO2 SERPL-SCNC: 25 MMOL/L (ref 22–29)
CREAT SERPL-MCNC: 1.21 MG/DL (ref 0.76–1.27)
DEPRECATED RDW RBC AUTO: 41.9 FL (ref 37–54)
EGFRCR SERPLBLD CKD-EPI 2021: 76.7 ML/MIN/1.73
ERYTHROCYTE [DISTWIDTH] IN BLOOD BY AUTOMATED COUNT: 12.3 % (ref 12.3–15.4)
GLOBULIN UR ELPH-MCNC: 2.4 GM/DL
GLUCOSE SERPL-MCNC: 117 MG/DL (ref 65–99)
HBA1C MFR BLD: 5.2 % (ref 4.8–5.6)
HCT VFR BLD AUTO: 45.9 % (ref 37.5–51)
HDLC SERPL-MCNC: 39 MG/DL (ref 40–60)
HGB BLD-MCNC: 15.2 G/DL (ref 13–17.7)
LDLC SERPL CALC-MCNC: 84 MG/DL (ref 0–100)
LDLC/HDLC SERPL: 2.21 {RATIO}
MCH RBC QN AUTO: 30.2 PG (ref 26.6–33)
MCHC RBC AUTO-ENTMCNC: 33.1 G/DL (ref 31.5–35.7)
MCV RBC AUTO: 91.1 FL (ref 79–97)
PLATELET # BLD AUTO: 183 10*3/MM3 (ref 140–450)
PMV BLD AUTO: 10 FL (ref 6–12)
POTASSIUM SERPL-SCNC: 4.5 MMOL/L (ref 3.5–5.2)
PROT SERPL-MCNC: 6.6 G/DL (ref 6–8.5)
RBC # BLD AUTO: 5.04 10*6/MM3 (ref 4.14–5.8)
SODIUM SERPL-SCNC: 139 MMOL/L (ref 136–145)
TRIGL SERPL-MCNC: 34 MG/DL (ref 0–150)
TSH SERPL DL<=0.05 MIU/L-ACNC: 1.48 UIU/ML (ref 0.27–4.2)
VLDLC SERPL-MCNC: 9 MG/DL (ref 5–40)
WBC NRBC COR # BLD AUTO: 6.61 10*3/MM3 (ref 3.4–10.8)

## 2023-12-19 PROCEDURE — 83036 HEMOGLOBIN GLYCOSYLATED A1C: CPT

## 2023-12-19 PROCEDURE — 80061 LIPID PANEL: CPT

## 2023-12-19 PROCEDURE — 80050 GENERAL HEALTH PANEL: CPT

## 2023-12-19 PROCEDURE — 36415 COLL VENOUS BLD VENIPUNCTURE: CPT

## 2024-02-28 NOTE — ASSESSMENT & PLAN NOTE
Continue metoprolol 25 mg daily.   [FreeTextEntry1] : This is a 72-year-old male for annual health assessment  Specifically we will address his history of hypertension hypercholesterolemia syncope and hemochromatosis [de-identified] : Overall patient is feeling well.  He does have some new complaints.  He has noticed a mild tremor.  He is having pain in his elbow and in his back.  He does have knee pain but this is chronic

## 2024-03-22 DIAGNOSIS — I10 ESSENTIAL HYPERTENSION: ICD-10-CM

## 2024-03-22 DIAGNOSIS — R00.0 TACHYCARDIA: ICD-10-CM

## 2024-03-22 RX ORDER — METOPROLOL SUCCINATE 25 MG/1
25 TABLET, EXTENDED RELEASE ORAL DAILY
Qty: 90 TABLET | Refills: 1 | Status: SHIPPED | OUTPATIENT
Start: 2024-03-22

## 2024-03-22 RX ORDER — LISINOPRIL 10 MG/1
10 TABLET ORAL DAILY
Qty: 90 TABLET | Refills: 1 | Status: SHIPPED | OUTPATIENT
Start: 2024-03-22

## 2024-06-03 NOTE — PROGRESS NOTES
"Chief Complaint  Follow-up (6 month follow up/) and Hypertension    Subjective          Robin Patel presents to [facility]  History of Present Illness  He returns for follow-up.    HTN: He is taking lisinopril 10 mg daily and metoprolol 25 mg daily, for which she also takes for tachycardia. His BP is high in office today. He has taken his medication. He doesn't check his BP at home. He denies chest pain, blurred vision, H/A, and pedal edema.     NAFLD: He does have a history of elevated liver enzymes.  His last blood work showed his ALT was slightly elevated at 43.      Objective   Vital Signs:   /85 (BP Location: Left arm, Patient Position: Sitting)   Pulse 76   Ht 188 cm (74\")   Wt (!) 144 kg (316 lb 12.8 oz)   SpO2 96% Comment: room air  BMI 40.67 kg/m²     Physical Exam  Constitutional:       General: He is not in acute distress.     Appearance: Normal appearance. He is obese.   HENT:      Head: Normocephalic.   Eyes:      Pupils: Pupils are equal, round, and reactive to light.      Visual Fields: Right eye visual fields normal and left eye visual fields normal.   Neck:      Trachea: Trachea normal.   Cardiovascular:      Rate and Rhythm: Normal rate and regular rhythm.      Heart sounds: Normal heart sounds.   Pulmonary:      Effort: Pulmonary effort is normal.      Breath sounds: Normal breath sounds and air entry.   Musculoskeletal:      Right lower leg: No edema.      Left lower leg: No edema.   Skin:     General: Skin is warm and dry.   Neurological:      Mental Status: He is alert and oriented to person, place, and time.   Psychiatric:         Mood and Affect: Mood and affect normal.         Behavior: Behavior normal.         Thought Content: Thought content normal.        Result Review :   The following data was reviewed by: MARISOL Fowler on 06/11/2024:  TSH (12/19/2023 11:31)  Hemoglobin A1c (12/19/2023 11:31)  Lipid Panel (12/19/2023 " 11:31)  Comprehensive Metabolic Panel (12/19/2023 11:31)  CBC (No Diff) (12/19/2023 11:31)                 Assessment and Plan    Diagnoses and all orders for this visit:    1. Essential hypertension (Primary)  Assessment & Plan:  Hypertension is uncontrolled  Medication changes per orders.  Dietary sodium restriction.  Weight loss.  Regular aerobic exercise.  Ambulatory blood pressure monitoring.  Blood pressure will be reassessedin 6 months.    Will increase his lisinopril 20 mg daily.     Orders:  -     CBC (No Diff); Future  -     Comprehensive Metabolic Panel; Future  -     Lipid Panel; Future  -     lisinopril (PRINIVIL,ZESTRIL) 20 MG tablet; Take 1 tablet by mouth Daily.  Dispense: 90 tablet; Refill: 1    2. Elevated LFTs  -     Comprehensive Metabolic Panel; Future    3. Need for vaccination  -     Td Vaccine => 8yo PF (TDVAX) 2-2    4. Tachycardia  Assessment & Plan:  Continue metoprolol 25 mg daily.      5. NAFLD (nonalcoholic fatty liver disease)  Assessment & Plan:  Will check a CMP.            Follow Up   Return in about 6 months (around 12/11/2024) for Annual physical.  Patient was given instructions and counseling regarding his condition or for health maintenance advice. Please see specific information pulled into the AVS if appropriate.

## 2024-06-11 ENCOUNTER — OFFICE VISIT (OUTPATIENT)
Dept: FAMILY MEDICINE CLINIC | Age: 44
End: 2024-06-11
Payer: COMMERCIAL

## 2024-06-11 VITALS
OXYGEN SATURATION: 96 % | HEIGHT: 74 IN | SYSTOLIC BLOOD PRESSURE: 144 MMHG | DIASTOLIC BLOOD PRESSURE: 85 MMHG | BODY MASS INDEX: 40.43 KG/M2 | WEIGHT: 315 LBS | HEART RATE: 76 BPM

## 2024-06-11 DIAGNOSIS — K76.0 NAFLD (NONALCOHOLIC FATTY LIVER DISEASE): ICD-10-CM

## 2024-06-11 DIAGNOSIS — I10 ESSENTIAL HYPERTENSION: Primary | ICD-10-CM

## 2024-06-11 DIAGNOSIS — R79.89 ELEVATED LFTS: ICD-10-CM

## 2024-06-11 DIAGNOSIS — Z23 NEED FOR VACCINATION: ICD-10-CM

## 2024-06-11 DIAGNOSIS — R00.0 TACHYCARDIA: ICD-10-CM

## 2024-06-11 PROCEDURE — 90714 TD VACC NO PRESV 7 YRS+ IM: CPT | Performed by: NURSE PRACTITIONER

## 2024-06-11 PROCEDURE — 99214 OFFICE O/P EST MOD 30 MIN: CPT | Performed by: NURSE PRACTITIONER

## 2024-06-11 PROCEDURE — 90471 IMMUNIZATION ADMIN: CPT | Performed by: NURSE PRACTITIONER

## 2024-06-11 RX ORDER — LISINOPRIL 20 MG/1
20 TABLET ORAL DAILY
Qty: 90 TABLET | Refills: 1 | Status: SHIPPED | OUTPATIENT
Start: 2024-06-11

## 2024-06-11 NOTE — ASSESSMENT & PLAN NOTE
Hypertension is uncontrolled  Medication changes per orders.  Dietary sodium restriction.  Weight loss.  Regular aerobic exercise.  Ambulatory blood pressure monitoring.  Blood pressure will be reassessedin 6 months.    Will increase his lisinopril 20 mg daily.

## 2024-06-25 ENCOUNTER — LAB (OUTPATIENT)
Dept: LAB | Facility: HOSPITAL | Age: 44
End: 2024-06-25
Payer: COMMERCIAL

## 2024-06-25 DIAGNOSIS — R73.9 ELEVATED BLOOD SUGAR: ICD-10-CM

## 2024-06-25 DIAGNOSIS — I10 ESSENTIAL HYPERTENSION: ICD-10-CM

## 2024-06-25 DIAGNOSIS — R79.89 ELEVATED LFTS: ICD-10-CM

## 2024-06-25 LAB
ALBUMIN SERPL-MCNC: 4.2 G/DL (ref 3.5–5.2)
ALBUMIN/GLOB SERPL: 1.8 G/DL
ALP SERPL-CCNC: 48 U/L (ref 39–117)
ALT SERPL W P-5'-P-CCNC: 35 U/L (ref 1–41)
ANION GAP SERPL CALCULATED.3IONS-SCNC: 8.3 MMOL/L (ref 5–15)
AST SERPL-CCNC: 21 U/L (ref 1–40)
BILIRUB SERPL-MCNC: 0.6 MG/DL (ref 0–1.2)
BUN SERPL-MCNC: 15 MG/DL (ref 6–20)
BUN/CREAT SERPL: 12.4 (ref 7–25)
CALCIUM SPEC-SCNC: 9.2 MG/DL (ref 8.6–10.5)
CHLORIDE SERPL-SCNC: 103 MMOL/L (ref 98–107)
CHOLEST SERPL-MCNC: 129 MG/DL (ref 0–200)
CO2 SERPL-SCNC: 26.7 MMOL/L (ref 22–29)
CREAT SERPL-MCNC: 1.21 MG/DL (ref 0.76–1.27)
DEPRECATED RDW RBC AUTO: 40.9 FL (ref 37–54)
EGFRCR SERPLBLD CKD-EPI 2021: 76.7 ML/MIN/1.73
ERYTHROCYTE [DISTWIDTH] IN BLOOD BY AUTOMATED COUNT: 12.2 % (ref 12.3–15.4)
GLOBULIN UR ELPH-MCNC: 2.3 GM/DL
GLUCOSE SERPL-MCNC: 119 MG/DL (ref 65–99)
HCT VFR BLD AUTO: 46.1 % (ref 37.5–51)
HDLC SERPL-MCNC: 38 MG/DL (ref 40–60)
HGB BLD-MCNC: 15.4 G/DL (ref 13–17.7)
LDLC SERPL CALC-MCNC: 78 MG/DL (ref 0–100)
LDLC/HDLC SERPL: 2.06 {RATIO}
MCH RBC QN AUTO: 30.3 PG (ref 26.6–33)
MCHC RBC AUTO-ENTMCNC: 33.4 G/DL (ref 31.5–35.7)
MCV RBC AUTO: 90.7 FL (ref 79–97)
PLATELET # BLD AUTO: 165 10*3/MM3 (ref 140–450)
PMV BLD AUTO: 10.1 FL (ref 6–12)
POTASSIUM SERPL-SCNC: 4.6 MMOL/L (ref 3.5–5.2)
PROT SERPL-MCNC: 6.5 G/DL (ref 6–8.5)
RBC # BLD AUTO: 5.08 10*6/MM3 (ref 4.14–5.8)
SODIUM SERPL-SCNC: 138 MMOL/L (ref 136–145)
TRIGL SERPL-MCNC: 63 MG/DL (ref 0–150)
VLDLC SERPL-MCNC: 13 MG/DL (ref 5–40)
WBC NRBC COR # BLD AUTO: 6.09 10*3/MM3 (ref 3.4–10.8)

## 2024-06-25 PROCEDURE — 36415 COLL VENOUS BLD VENIPUNCTURE: CPT

## 2024-06-25 PROCEDURE — 83036 HEMOGLOBIN GLYCOSYLATED A1C: CPT

## 2024-06-25 PROCEDURE — 80053 COMPREHEN METABOLIC PANEL: CPT

## 2024-06-25 PROCEDURE — 85027 COMPLETE CBC AUTOMATED: CPT

## 2024-06-25 PROCEDURE — 80061 LIPID PANEL: CPT

## 2024-06-26 DIAGNOSIS — R73.9 ELEVATED BLOOD SUGAR: Primary | ICD-10-CM

## 2024-06-26 LAB — HBA1C MFR BLD: 5.7 % (ref 4.8–5.6)

## 2024-09-05 DIAGNOSIS — I10 ESSENTIAL HYPERTENSION: ICD-10-CM

## 2024-09-05 RX ORDER — LISINOPRIL 20 MG/1
20 TABLET ORAL DAILY
Qty: 90 TABLET | Refills: 1 | Status: CANCELLED | OUTPATIENT
Start: 2024-09-05

## 2024-12-02 DIAGNOSIS — I10 ESSENTIAL HYPERTENSION: ICD-10-CM

## 2024-12-02 DIAGNOSIS — R00.0 TACHYCARDIA: ICD-10-CM

## 2024-12-02 RX ORDER — LISINOPRIL 20 MG/1
20 TABLET ORAL DAILY
Qty: 90 TABLET | Refills: 0 | Status: SHIPPED | OUTPATIENT
Start: 2024-12-02

## 2024-12-02 RX ORDER — METOPROLOL SUCCINATE 25 MG/1
25 TABLET, EXTENDED RELEASE ORAL DAILY
Qty: 90 TABLET | Refills: 0 | Status: SHIPPED | OUTPATIENT
Start: 2024-12-02

## 2024-12-02 RX ORDER — METOPROLOL SUCCINATE 25 MG/1
25 TABLET, EXTENDED RELEASE ORAL DAILY
Qty: 90 TABLET | Refills: 1 | Status: CANCELLED | OUTPATIENT
Start: 2024-12-02

## 2024-12-02 NOTE — PROGRESS NOTES
"Chief Complaint  Annual Exam    Subjective          Robin Patel presents to [facility]  History of Present Illness  He is here for physical.    His tetanus is up-to-date.  He has recently had his COVID and flu vaccine. He does wear his seat belt. He does go to the dentist. He doesn't go to the eye doctor. He isn't eating healthy since the holidays are approaching. He isn't physically active like he should. He doesn't drink as much water in the winter as he does in the warmer weather.     HTN: He is taking lisinopril 10 mg daily metoprolol 25 mg Cade daily, for which he takes for tachycardia as well.    NAFLD: He has a history of elevated liver enzymes.      Objective   Vital Signs:   /73 (BP Location: Left arm, Patient Position: Sitting)   Pulse 82   Temp 98.2 °F (36.8 °C) (Oral)   Ht 188 cm (74\")   Wt (!) 144 kg (317 lb 6.4 oz)   SpO2 98% Comment: room air  BMI 40.75 kg/m²     Physical Exam  Constitutional:       General: He is not in acute distress.     Appearance: Normal appearance. He is well-developed. He is obese.   HENT:      Head: Normocephalic.      Right Ear: Tympanic membrane, ear canal and external ear normal.      Left Ear: Tympanic membrane, ear canal and external ear normal.      Mouth/Throat:      Mouth: Mucous membranes are moist.      Pharynx: Oropharynx is clear. No oropharyngeal exudate or posterior oropharyngeal erythema.   Eyes:      Extraocular Movements: Extraocular movements intact.      Conjunctiva/sclera: Conjunctivae normal.      Pupils: Pupils are equal, round, and reactive to light.      Visual Fields: Right eye visual fields normal and left eye visual fields normal.   Neck:      Thyroid: No thyromegaly or thyroid tenderness.   Cardiovascular:      Rate and Rhythm: Normal rate and regular rhythm.      Heart sounds: Normal heart sounds.   Pulmonary:      Effort: Pulmonary effort is normal. No retractions.      Breath sounds: Normal breath " sounds and air entry.   Abdominal:      General: Abdomen is flat. Bowel sounds are normal. There is no distension.      Palpations: Abdomen is soft. There is no mass.      Tenderness: There is no abdominal tenderness.   Musculoskeletal:         General: Normal range of motion.      Cervical back: Normal range of motion and neck supple.      Right lower leg: No edema.      Left lower leg: No edema.   Lymphadenopathy:      Cervical: No cervical adenopathy.   Skin:     General: Skin is warm and dry.   Neurological:      Mental Status: He is alert and oriented to person, place, and time.      Gait: Gait normal.   Psychiatric:         Mood and Affect: Mood and affect normal.         Behavior: Behavior normal.         Thought Content: Thought content normal.        Result Review :   The following data was reviewed by: MARISOL Fowler on 12/17/2024:                  Assessment and Plan    Diagnoses and all orders for this visit:    1. Annual physical exam (Primary)  -     CBC (No Diff); Future  -     Comprehensive Metabolic Panel; Future  -     TSH; Future  -     Lipid Panel; Future  -     Hemoglobin A1c; Future    2. Preventative health care  -     CBC (No Diff); Future  -     Comprehensive Metabolic Panel; Future  -     TSH; Future  -     Lipid Panel; Future  -     Hemoglobin A1c; Future    3. Screening for diabetes mellitus (DM)  -     Hemoglobin A1c; Future    4. Screening for cholesterol level  -     Lipid Panel; Future    5. Screening for thyroid disorder  -     TSH; Future    6. Obesity (BMI 30-39.9)  Assessment & Plan:  Patient's (Body mass index is 40.75 kg/m².) indicates that they are obese (BMI >30) with health conditions that include hypertension . Weight is unchanged. BMI  is above average; BMI management plan is completed. We discussed portion control and increasing exercise.       7. Essential hypertension  -     lisinopril (PRINIVIL,ZESTRIL) 20 MG tablet; Take 1 tablet by mouth Daily.  Dispense: 90  tablet; Refill: 1  -     metoprolol succinate XL (Toprol XL) 25 MG 24 hr tablet; Take 1 tablet by mouth Daily.  Dispense: 90 tablet; Refill: 1    8. Tachycardia  -     metoprolol succinate XL (Toprol XL) 25 MG 24 hr tablet; Take 1 tablet by mouth Daily.  Dispense: 90 tablet; Refill: 1    Preventative measures discussed.            Follow Up   Return in about 6 months (around 6/17/2025).  Patient was given instructions and counseling regarding his condition or for health maintenance advice. Please see specific information pulled into the AVS if appropriate.

## 2024-12-17 ENCOUNTER — OFFICE VISIT (OUTPATIENT)
Dept: FAMILY MEDICINE CLINIC | Age: 44
End: 2024-12-17
Payer: COMMERCIAL

## 2024-12-17 VITALS
HEIGHT: 74 IN | DIASTOLIC BLOOD PRESSURE: 73 MMHG | OXYGEN SATURATION: 98 % | BODY MASS INDEX: 40.43 KG/M2 | TEMPERATURE: 98.2 F | HEART RATE: 82 BPM | SYSTOLIC BLOOD PRESSURE: 132 MMHG | WEIGHT: 315 LBS

## 2024-12-17 DIAGNOSIS — E66.9 OBESITY (BMI 30-39.9): ICD-10-CM

## 2024-12-17 DIAGNOSIS — Z00.00 PREVENTATIVE HEALTH CARE: ICD-10-CM

## 2024-12-17 DIAGNOSIS — Z13.220 SCREENING FOR CHOLESTEROL LEVEL: ICD-10-CM

## 2024-12-17 DIAGNOSIS — Z13.29 SCREENING FOR THYROID DISORDER: ICD-10-CM

## 2024-12-17 DIAGNOSIS — I10 ESSENTIAL HYPERTENSION: ICD-10-CM

## 2024-12-17 DIAGNOSIS — Z00.00 ANNUAL PHYSICAL EXAM: Primary | ICD-10-CM

## 2024-12-17 DIAGNOSIS — Z13.1 SCREENING FOR DIABETES MELLITUS (DM): ICD-10-CM

## 2024-12-17 DIAGNOSIS — R00.0 TACHYCARDIA: ICD-10-CM

## 2024-12-17 PROCEDURE — 99396 PREV VISIT EST AGE 40-64: CPT | Performed by: NURSE PRACTITIONER

## 2024-12-17 RX ORDER — LISINOPRIL 20 MG/1
20 TABLET ORAL DAILY
Qty: 90 TABLET | Refills: 1 | Status: SHIPPED | OUTPATIENT
Start: 2024-12-17

## 2024-12-17 RX ORDER — METOPROLOL SUCCINATE 25 MG/1
25 TABLET, EXTENDED RELEASE ORAL DAILY
Qty: 90 TABLET | Refills: 1 | Status: SHIPPED | OUTPATIENT
Start: 2024-12-17

## 2024-12-17 NOTE — ASSESSMENT & PLAN NOTE
Patient's (Body mass index is 40.75 kg/m².) indicates that they are obese (BMI >30) with health conditions that include hypertension . Weight is unchanged. BMI  is above average; BMI management plan is completed. We discussed portion control and increasing exercise.

## 2025-01-07 ENCOUNTER — TELEPHONE (OUTPATIENT)
Dept: FAMILY MEDICINE CLINIC | Age: 45
End: 2025-01-07
Payer: COMMERCIAL

## 2025-01-07 NOTE — TELEPHONE ENCOUNTER
1st attempt. Spoke to pt regarding overdue labs ordered on 12/17/24 by Flower Naidu. Pt will come in at earliest convenience.

## 2025-01-28 ENCOUNTER — LAB (OUTPATIENT)
Dept: LAB | Facility: HOSPITAL | Age: 45
End: 2025-01-28
Payer: COMMERCIAL

## 2025-01-28 DIAGNOSIS — Z00.00 ANNUAL PHYSICAL EXAM: ICD-10-CM

## 2025-01-28 DIAGNOSIS — Z13.1 SCREENING FOR DIABETES MELLITUS (DM): ICD-10-CM

## 2025-01-28 DIAGNOSIS — Z00.00 PREVENTATIVE HEALTH CARE: ICD-10-CM

## 2025-01-28 DIAGNOSIS — Z13.220 SCREENING FOR CHOLESTEROL LEVEL: ICD-10-CM

## 2025-01-28 DIAGNOSIS — Z13.29 SCREENING FOR THYROID DISORDER: ICD-10-CM

## 2025-01-28 LAB
ALBUMIN SERPL-MCNC: 3.9 G/DL (ref 3.5–5.2)
ALBUMIN/GLOB SERPL: 1.3 G/DL
ALP SERPL-CCNC: 56 U/L (ref 39–117)
ALT SERPL W P-5'-P-CCNC: 44 U/L (ref 1–41)
ANION GAP SERPL CALCULATED.3IONS-SCNC: 12.5 MMOL/L (ref 5–15)
AST SERPL-CCNC: 27 U/L (ref 1–40)
BILIRUB SERPL-MCNC: 0.5 MG/DL (ref 0–1.2)
BUN SERPL-MCNC: 17 MG/DL (ref 6–20)
BUN/CREAT SERPL: 14.7 (ref 7–25)
CALCIUM SPEC-SCNC: 9 MG/DL (ref 8.6–10.5)
CHLORIDE SERPL-SCNC: 104 MMOL/L (ref 98–107)
CHOLEST SERPL-MCNC: 127 MG/DL (ref 0–200)
CO2 SERPL-SCNC: 23.5 MMOL/L (ref 22–29)
CREAT SERPL-MCNC: 1.16 MG/DL (ref 0.76–1.27)
DEPRECATED RDW RBC AUTO: 40 FL (ref 37–54)
EGFRCR SERPLBLD CKD-EPI 2021: 80.1 ML/MIN/1.73
ERYTHROCYTE [DISTWIDTH] IN BLOOD BY AUTOMATED COUNT: 12.1 % (ref 12.3–15.4)
GLOBULIN UR ELPH-MCNC: 3 GM/DL
GLUCOSE SERPL-MCNC: 117 MG/DL (ref 65–99)
HBA1C MFR BLD: 5.4 % (ref 4.8–5.6)
HCT VFR BLD AUTO: 45.4 % (ref 37.5–51)
HDLC SERPL-MCNC: 37 MG/DL (ref 40–60)
HGB BLD-MCNC: 15.1 G/DL (ref 13–17.7)
LDLC SERPL CALC-MCNC: 79 MG/DL (ref 0–100)
LDLC/HDLC SERPL: 2.16 {RATIO}
MCH RBC QN AUTO: 29.8 PG (ref 26.6–33)
MCHC RBC AUTO-ENTMCNC: 33.3 G/DL (ref 31.5–35.7)
MCV RBC AUTO: 89.7 FL (ref 79–97)
PLATELET # BLD AUTO: 188 10*3/MM3 (ref 140–450)
PMV BLD AUTO: 10.1 FL (ref 6–12)
POTASSIUM SERPL-SCNC: 4 MMOL/L (ref 3.5–5.2)
PROT SERPL-MCNC: 6.9 G/DL (ref 6–8.5)
RBC # BLD AUTO: 5.06 10*6/MM3 (ref 4.14–5.8)
SODIUM SERPL-SCNC: 140 MMOL/L (ref 136–145)
TRIGL SERPL-MCNC: 50 MG/DL (ref 0–150)
TSH SERPL DL<=0.05 MIU/L-ACNC: 1.39 UIU/ML (ref 0.27–4.2)
VLDLC SERPL-MCNC: 11 MG/DL (ref 5–40)
WBC NRBC COR # BLD AUTO: 6.55 10*3/MM3 (ref 3.4–10.8)

## 2025-01-28 PROCEDURE — 36415 COLL VENOUS BLD VENIPUNCTURE: CPT

## 2025-01-28 PROCEDURE — 80050 GENERAL HEALTH PANEL: CPT

## 2025-01-28 PROCEDURE — 80061 LIPID PANEL: CPT

## 2025-01-28 PROCEDURE — 83036 HEMOGLOBIN GLYCOSYLATED A1C: CPT

## 2025-06-16 ENCOUNTER — OFFICE VISIT (OUTPATIENT)
Dept: FAMILY MEDICINE CLINIC | Age: 45
End: 2025-06-16
Payer: COMMERCIAL

## 2025-06-16 VITALS
SYSTOLIC BLOOD PRESSURE: 130 MMHG | TEMPERATURE: 98 F | OXYGEN SATURATION: 98 % | BODY MASS INDEX: 40.07 KG/M2 | HEIGHT: 74 IN | HEART RATE: 82 BPM | DIASTOLIC BLOOD PRESSURE: 89 MMHG | WEIGHT: 312.2 LBS

## 2025-06-16 DIAGNOSIS — I10 ESSENTIAL HYPERTENSION: Primary | ICD-10-CM

## 2025-06-16 DIAGNOSIS — N62 GYNECOMASTIA, MALE: ICD-10-CM

## 2025-06-16 DIAGNOSIS — R00.0 TACHYCARDIA: ICD-10-CM

## 2025-06-16 PROCEDURE — 99214 OFFICE O/P EST MOD 30 MIN: CPT | Performed by: NURSE PRACTITIONER

## 2025-06-16 RX ORDER — LISINOPRIL 20 MG/1
20 TABLET ORAL DAILY
Qty: 90 TABLET | Refills: 1 | Status: SHIPPED | OUTPATIENT
Start: 2025-06-16

## 2025-06-16 RX ORDER — METOPROLOL SUCCINATE 25 MG/1
25 TABLET, EXTENDED RELEASE ORAL DAILY
Qty: 90 TABLET | Refills: 1 | Status: SHIPPED | OUTPATIENT
Start: 2025-06-16

## 2025-06-16 NOTE — PROGRESS NOTES
Please note that portions of this document were completed using a voice recognition program.     Patient or patient representative verbalized consent for the use of Ambient Listening during the visit with  MARISOL Hensley for chart documentation. 6/16/2025  13:37 EDT    Chief Complaint  Hypertension ([facility] from [facility]- 6 month f/u) and Breast Pain (Having pain in right breast x4 months )    Subjective        Robin Patel presents to [facility] today for       History of Present Illness  The patient is a 44-year-old male who presents to establish care with a new primary care provider as his previous provider has left the practice.    He has been experiencing right breast pain for approximately 3 to 4 months, which he describes as sudden in onset and akin to the sensation of pressing on a bruise. 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 The pain has persisted since then. Additionally, he reports sensitivity in the area and notes that his nipple appears slightly swollen and inverted compared to the left side. He does not use any over-the-counter supplements or testosterone. He has a half-sister with a history of breast cancer.    He has a long-term history of blood pressure issues. He was previously under the care of  Flower Naidu, who prescribed lisinopril and metoprolol around 2019. This treatment was initiated following an episode of elevated heart rate and high blood pressure during a visit for a sinus infection. Upon returning to the clinic, he found that she had left the practice, so he saw another provider to get a refill.  He has continued the medication regimen since then. He reports no significant cough but does experience occasional fatigue. He is currently on lisinopril and metoprolol.    He also experiences seasonal allergies and is taking Xyzal for his allergies.    FAMILY HISTORY  He has a  "half-sister who had breast cancer.          Current Outpatient Medications:     levocetirizine (XYZAL) 5 MG tablet, Take 1 tablet by mouth Every Evening., Disp: , Rfl:     lisinopril (PRINIVIL,ZESTRIL) 20 MG tablet, Take 1 tablet by mouth Daily., Disp: 90 tablet, Rfl: 1    metoprolol succinate XL (Toprol XL) 25 MG 24 hr tablet, Take 1 tablet by mouth Daily., Disp: 90 tablet, Rfl: 1  Medications Discontinued During This Encounter   Medication Reason    lisinopril (PRINIVIL,ZESTRIL) 20 MG tablet Reorder    metoprolol succinate XL (Toprol XL) 25 MG 24 hr tablet Reorder         Allergies:  Patient has no known allergies.      Objective   Vital Signs:   Vitals:    06/16/25 1332   BP: 130/89   BP Location: Right arm   Patient Position: Sitting   Cuff Size: Large Adult   Pulse: 82   Temp: 98 °F (36.7 °C)   TempSrc: Oral   SpO2: 98%   Weight: (!) 142 kg (312 lb 3.2 oz)   Height: 188 cm (74.02\")     Body mass index is 40.07 kg/m².           Physical Exam  Constitutional:       Appearance: Normal appearance.   Neck:      Vascular: No carotid bruit.   Cardiovascular:      Rate and Rhythm: Normal rate and regular rhythm.      Heart sounds: Normal heart sounds.   Pulmonary:      Effort: Pulmonary effort is normal.      Breath sounds: Normal breath sounds.   Chest:      Comments: Right breast tenderness and enlargement , no definitive mass note, no nipple discharge , nipple slight retracted   Musculoskeletal:         General: Normal range of motion.   Skin:     General: Skin is warm and dry.   Neurological:      General: No focal deficit present.      Mental Status: He is alert.   Psychiatric:         Mood and Affect: Mood normal.         Behavior: Behavior normal.             Lab Results   Component Value Date    GLUCOSE 117 (H) 01/28/2025    BUN 17 01/28/2025    CREATININE 1.16 01/28/2025    EGFRIFNONA 60 (L) 10/26/2021    BCR 14.7 01/28/2025    K 4.0 01/28/2025    CO2 23.5 01/28/2025    CALCIUM 9.0 01/28/2025    ALBUMIN 3.9 " 01/28/2025    AST 27 01/28/2025    ALT 44 (H) 01/28/2025       Lab Results   Component Value Date    CHOL 127 01/28/2025    TRIG 50 01/28/2025    HDL 37 (L) 01/28/2025    LDL 79 01/28/2025       Lab Results   Component Value Date    WBC 6.55 01/28/2025    HGB 15.1 01/28/2025    HCT 45.4 01/28/2025    MCV 89.7 01/28/2025     01/28/2025                     Procedures         Diagnoses and all orders for this visit:    1. Essential hypertension (Primary)  -     TSH Rfx On Abnormal To Free T4; Future  -     metoprolol succinate XL (Toprol XL) 25 MG 24 hr tablet; Take 1 tablet by mouth Daily.  Dispense: 90 tablet; Refill: 1  -     lisinopril (PRINIVIL,ZESTRIL) 20 MG tablet; Take 1 tablet by mouth Daily.  Dispense: 90 tablet; Refill: 1    2. Tachycardia  -     metoprolol succinate XL (Toprol XL) 25 MG 24 hr tablet; Take 1 tablet by mouth Daily.  Dispense: 90 tablet; Refill: 1    3. Gynecomastia, male  -     US Breast Bilateral Limited; Future  -     Testosterone, Free, Total; Future          Assessment & Plan  1. Right breast pain.  - Reports right breast pain persisting for three to four months, described as similar to pushing on a bruise.  - Noticeable swelling and sensitivity in the right nipple.  - Bilateral ultrasound will be scheduled, followed by a diagnostic mammogram if necessary.  - Free and total testosterone test will be ordered, to be conducted between 8 and 10 AM.    2. Blood pressure management.  - Blood pressure readings are within the normal range today at 130/89, and heart rate is stable at 82.  - Has been on Lisinopril and Metoprolol since 2019.  - Current medication regimen will be maintained without any changes.  - Prescriptions will be sent to the pharmacy.    3. Seasonal allergies.  - Taking Xyzal for allergies.              Follow Up  Return in about 6 months (around 12/16/2025) for Annual physical.  Patient was given instructions and counseling regarding his condition or for health  maintenance advice. Please see specific information pulled into the AVS if appropriate.           Phil Greer, MARISOL  06/16/2025

## 2025-06-17 ENCOUNTER — LAB (OUTPATIENT)
Dept: LAB | Facility: HOSPITAL | Age: 45
End: 2025-06-17
Payer: COMMERCIAL

## 2025-06-17 DIAGNOSIS — I10 ESSENTIAL HYPERTENSION: ICD-10-CM

## 2025-06-17 DIAGNOSIS — N62 GYNECOMASTIA, MALE: ICD-10-CM

## 2025-06-17 LAB — TSH SERPL DL<=0.05 MIU/L-ACNC: 1.44 UIU/ML (ref 0.27–4.2)

## 2025-06-17 PROCEDURE — 84402 ASSAY OF FREE TESTOSTERONE: CPT

## 2025-06-17 PROCEDURE — 36415 COLL VENOUS BLD VENIPUNCTURE: CPT

## 2025-06-17 PROCEDURE — 84443 ASSAY THYROID STIM HORMONE: CPT

## 2025-06-17 PROCEDURE — 84403 ASSAY OF TOTAL TESTOSTERONE: CPT

## 2025-06-21 LAB
TESTOST FREE SERPL-MCNC: 6 PG/ML (ref 6.8–21.5)
TESTOST SERPL-MCNC: 502 NG/DL (ref 264–916)

## 2025-07-03 DIAGNOSIS — N62 GYNECOMASTIA, MALE: Primary | ICD-10-CM

## 2025-07-08 ENCOUNTER — HOSPITAL ENCOUNTER (OUTPATIENT)
Dept: MAMMOGRAPHY | Facility: HOSPITAL | Age: 45
Discharge: HOME | End: 2025-07-08
Payer: COMMERCIAL

## 2025-07-08 ENCOUNTER — HOSPITAL ENCOUNTER (OUTPATIENT)
Dept: ULTRASOUND IMAGING | Facility: HOSPITAL | Age: 45
Discharge: HOME | End: 2025-07-08
Payer: COMMERCIAL

## 2025-07-08 DIAGNOSIS — N62 GYNECOMASTIA, MALE: ICD-10-CM

## 2025-07-08 PROCEDURE — G0279 TOMOSYNTHESIS, MAMMO: HCPCS

## 2025-07-08 PROCEDURE — 77066 DX MAMMO INCL CAD BI: CPT
